# Patient Record
Sex: MALE | Race: WHITE | Employment: FULL TIME | URBAN - METROPOLITAN AREA
[De-identification: names, ages, dates, MRNs, and addresses within clinical notes are randomized per-mention and may not be internally consistent; named-entity substitution may affect disease eponyms.]

---

## 2017-02-02 ENCOUNTER — HOSPITAL ENCOUNTER (OUTPATIENT)
Dept: OTHER | Age: 57
Discharge: HOME | End: 2017-02-02
Attending: INTERNAL MEDICINE | Admitting: INTERNAL MEDICINE

## 2017-02-02 LAB
A/G RATIO: 1.4 (ref 1.1–2.2)
ALBUMIN SERPL-MCNC: 3.9 G/DL (ref 3.4–5)
ALP BLD-CCNC: 75 U/L (ref 40–129)
ALT SERPL-CCNC: 34 U/L (ref 10–40)
ANION GAP SERPL CALCULATED.3IONS-SCNC: 13 MMOL/L (ref 3–16)
AST SERPL-CCNC: 30 U/L (ref 15–37)
BILIRUB SERPL-MCNC: 0.4 MG/DL (ref 0–1)
BUN BLDV-MCNC: 11 MG/DL (ref 7–20)
CALCIUM SERPL-MCNC: 8.7 MG/DL (ref 8.3–10.6)
CHLORIDE BLD-SCNC: 99 MMOL/L (ref 99–110)
CO2: 26 MMOL/L (ref 21–32)
CREAT SERPL-MCNC: 1 MG/DL (ref 0.9–1.3)
GFR AFRICAN AMERICAN: >60
GFR NON-AFRICAN AMERICAN: >60
GLOBULIN: 2.8 G/DL
GLUCOSE BLD-MCNC: 129 MG/DL (ref 70–99)
HCT VFR BLD CALC: 45.9 % (ref 40.5–52.5)
HEMOGLOBIN: 14.7 G/DL (ref 13.5–17.5)
MAGNESIUM: 2.1 MG/DL (ref 1.8–2.4)
MCH RBC QN AUTO: 27.5 PG (ref 26–34)
MCHC RBC AUTO-ENTMCNC: 32.1 G/DL (ref 31–36)
MCV RBC AUTO: 85.7 FL (ref 80–100)
PDW BLD-RTO: 15 % (ref 12.4–15.4)
PHOSPHORUS: 2.4 MG/DL (ref 2.5–4.9)
PLATELET # BLD: 290 K/UL (ref 135–450)
PMV BLD AUTO: 8.4 FL (ref 5–10.5)
POTASSIUM SERPL-SCNC: 3.7 MMOL/L (ref 3.5–5.1)
RBC # BLD: 5.36 M/UL (ref 4.2–5.9)
SODIUM BLD-SCNC: 138 MMOL/L (ref 136–145)
TOTAL PROTEIN: 6.7 G/DL (ref 6.4–8.2)
WBC # BLD: 9.2 K/UL (ref 4–11)

## 2017-10-18 ENCOUNTER — HOSPITAL ENCOUNTER (OUTPATIENT)
Dept: PHYSICAL THERAPY | Age: 57
Discharge: HOME | End: 2017-10-31
Admitting: EMERGENCY MEDICINE

## 2017-10-18 NOTE — PLAN OF CARE
including       G-Codes:  PT G-Codes  Functional Assessment Tool Used: RILEY  Score: 28%  Functional Limitation: Mobility: Walking and moving around  Mobility: Walking and Moving Around Current Status (): At least 20 percent but less than 40 percent impaired, limited or restricted  Mobility: Walking and Moving Around Goal Status (): At least 1 percent but less than 20 percent impaired, limited or restricted    ASSESSMENT: Patient presents today with lumbar radic with gluteal inhibition. He has mild strength loss of R PF , patient was initiated on HEP that consisted of hip mobility in fieg 4 positioning.    Functional Impairments:     [x]Noted lumbar/proximal hip hypomobility   []Noted lumbosacral and/or generalized hypermobility   []Decreased Lumbosacral/hip/LE functional ROM   [x]Decreased core/proximal hip strength and neuromuscular control    [x]Decreased LE functional strength    []Abnormal reflexes/sensation/myotomal/dermatomal deficits  [x]Reduced balance/proprioceptive control    []other:      Functional Activity Limitations (from functional questionnaire and intake)   [x]Reduced ability to tolerate prolonged functional positions   []Reduced ability or difficulty with changes of positions or transfers between positions   []Reduced ability to maintain good posture and demonstrate good body mechanics with sitting, bending, and lifting   []Reduced ability to sleep   [x] Reduced ability or tolerance with driving and/or computer work   [x]Reduced ability to perform lifting, reaching, carrying tasks   []Reduced ability to squat   []Reduced ability to forward bend   [x]Reduced ability to ambulate prolonged functional periods/distances/surfaces   []Reduced ability to ascend/descend stairs   []other:       Participation Restrictions   []Reduced participation in self care activities   [x]Reduced participation in home management activities   [x]Reduced participation in work activities   [x]Reduced participation [] high complexity using standardized patient assessment instrument and/or measurable assessment of functional outcome. [x] EVAL (LOW) 72240 (typically 30 minutes face-to-face)  [] EVAL (MOD) 60661 (typically 30 minutes face-to-face)  [] EVAL (HIGH) 50226 (typically 45 minutes face-to-face)  [] RE-EVAL     PLAN: Begin PT focusing on: proximal hip mobilizations, LB mobs, LB core activation, proximal hip activation, and HEP    Frequency/Duration:  2 days per week for 4 Weeks:  Interventions:  [x]  Therapeutic exercise including: strength training, ROM, for LE, Glutes and core   [x]  NMR activation and proprioception for glutes , LE and Core   [x]  Manual therapy as indicated for Hip complex, LE and spine to include: Dry Needling/IASTM, STM, PROM, Gr I-IV mobilizations, manipulation. [x]  Modalities as needed that may include: thermal agents, E-stim, Biofeedback, US, iontophoresis as indicated  [x]  Patient education on joint protection, postural re-education, activity modification, progression of HEP. HEP instruction:  (see scanned forms)    GOALS:  Patient stated goal: decrease pain and return to work    Therapist goals for Patient:   Short Term Goals: To be achieved in: 2 weeks  1. Independent in HEP and progression per patient tolerance, in order to prevent re-injury. 2. Patient will have a decrease in pain to facilitate improvement in movement, function, and ADLs as indicated by Functional Deficits. Long Term Goals: To be achieved in: 4 weeks  1. Disability index score of 15% or less for the RILEY to assist with reaching prior level of function. 2. Patient will demonstrate increased AROM to WNL, good LS mobility, good hip ROM to allow for proper joint functioning as indicated by patients Functional Deficits. 3. Patient will demonstrate an increase in Strength to good proximal hip and core activation to allow for proper functional mobility as indicated by patients Functional Deficits.    4. Patient will return to walk functional activities without increased symptoms or restriction.    5. Golf with no pain(patient specific functional goal)       Electronically signed by:  Clemente Graham PT

## 2017-10-19 NOTE — FLOWSHEET NOTE
[address], [facility]    Physical Therapy Daily Treatment Note  Date:  10/18/2017    Patient Name:  Joaquim Rosenthal    :  1960  MRN: [id number]  Restrictions/Precautions:    Medical/Treatment Diagnosis Information:  · Diagnosis: M51.16 (ICD-10-CM) - Lumbar disc disease with radiculopathy  · Treatment Diagnosis: M51.16 (ICD-10-CM) - Lumbar disc disease with radiculopathy  Insurance/Certification information:     Physician Information:  Referring Practitioner: Dr Wilkinson Locus of care signed (Y/N):     Date of Patient follow up with Physician:     G-Code (if applicable):      Date G-Code Applied:    PT G-Codes  Functional Assessment Tool Used: RILEY  Score: 28%  Functional Limitation: Mobility: Walking and moving around  Mobility: Walking and Moving Around Current Status (): At least 20 percent but less than 40 percent impaired, limited or restricted  Mobility: Walking and Moving Around Goal Status ():  At least 1 percent but less than 20 percent impaired, limited or restricted    Progress Note: []  Yes  []  No  Next due by: Visit #10      Latex Allergy:  [x]NO      []YES   Preferred Language for Healthcare:   [x]English       []other:     Visit # Insurance Allowable   1 12     Pain level:  0-9/10     SUBJECTIVE:  See eval    OBJECTIVE: See eval  Observation:   Test measurements:      RESTRICTIONS/PRECAUTIONS: R hip flexor, Lateral hip pain and LE tingling    Exercises/Interventions:     Therapeutic Ex Wt / Resistance sets/sec reps notes   Hip Ext       Bridge 2-1       Kneeling Alt Arm-Leg       Side lying LB rot       Front Plank       Side planks        Kneeling hip abd/ext       1/2 kneeling down chop       Std band pull down       SL hip abd/clam       Fig 4 st supine KAYY  1 15           Ham string stretch  3 30    Hip Flex stretch  3 30    Glute Stretch  3 30           Manual Intervention               Prone PA L5  5 min     GISTM/STM Hip

## 2017-10-23 ENCOUNTER — HOSPITAL ENCOUNTER (OUTPATIENT)
Dept: PHYSICAL THERAPY | Age: 57
Discharge: HOME | End: 2017-10-23
Admitting: EMERGENCY MEDICINE

## 2017-10-23 NOTE — FLOWSHEET NOTE
Prone PA  T12/L1,L5  12 min     GISTM/STM Hip flexor 5 min     Prone Fig 4  10 min     SI Manip       Hip belt mobs       Hip LA distraction              NMR re-education        Mf Activation- re-ed  10 10 Prone    TrA Re-ed activation  10 10 supine    Glute Max re-ed activation  10 10 Prone                    Therapeutic Exercise and NMR EXR  [x] (20062) Provided verbal/tactile cueing for activities related to strengthening, flexibility, endurance, ROM  for improvements in proximal hip and core control with self care, mobility, lifting and ambulation. [x] (76663) Provided verbal/tactile cueing for activities related to improving balance, coordination, kinesthetic sense, posture, motor skill, proprioception  to assist with core control in self care, mobility, lifting, and ambulation.      Therapeutic Activities:    [x] (64613 or 58768) Provided verbal/tactile cueing for activities related to improving balance, coordination, kinesthetic sense, posture, motor skill, proprioception and motor activation to allow for proper function  with self care and ADLs  [] (11524) Provided training and instruction to the patient for proper core and proximal hip recruitment and positioning with ambulation re-education     Home Exercise Program:    [x] (06370) Reviewed/Progressed HEP activities related to strengthening, flexibility, endurance, ROM of core, proximal hip and LE for functional self-care, mobility, lifting and ambulation   [] (58357) Reviewed/Progressed HEP activities related to improving balance, coordination, kinesthetic sense, posture, motor skill, proprioception of core, proximal hip and LE for self care, mobility, lifting, and ambulation      Manual Treatments:  PROM / STM / Oscillations-Mobs:  G-I, II, III, IV (PA's, Inf., Post.)  [] (53719) Provided manual therapy to mobilize proximal hip and LS spine soft tissue/joints for the purpose of modulating pain, promoting relaxation,  increasing ROM, limited by fatique  [] Patient limited by pain  [] Patient limited by other medical complications  [] Other:     Prognosis: [] Good [] Fair  [] Poor    Patient Requires Follow-up: [x] Yes  [] No    PLAN: See eval  [] Continue per plan of care [] Alter current plan (see comments)  [x] Plan of care initiated [] Hold pending MD visit [] Discharge    Electronically signed by: Celine Mccullough PT

## 2017-11-01 ENCOUNTER — HOSPITAL ENCOUNTER (OUTPATIENT)
Dept: PHYSICAL THERAPY | Age: 57
Discharge: HOME | End: 2017-11-30
Attending: EMERGENCY MEDICINE | Admitting: EMERGENCY MEDICINE

## 2017-11-03 ENCOUNTER — HOSPITAL ENCOUNTER (OUTPATIENT)
Dept: PHYSICAL THERAPY | Age: 57
Discharge: HOME | End: 2017-11-03
Admitting: EMERGENCY MEDICINE

## 2017-11-10 ENCOUNTER — HOSPITAL ENCOUNTER (OUTPATIENT)
Dept: PHYSICAL THERAPY | Age: 57
Discharge: HOME | End: 2017-11-10
Admitting: EMERGENCY MEDICINE

## 2017-11-10 NOTE — FLOWSHEET NOTE
[address], [facility]    Physical Therapy Daily Treatment Note  Date:  11/10/2017    Patient Name:  Willow Bansal    :  1960  MRN: [id number]  Restrictions/Precautions:    Medical/Treatment Diagnosis Information:  · Diagnosis: M51.16 (ICD-10-CM) - Lumbar disc disease with radiculopathy  · Treatment Diagnosis: M51.16 (ICD-10-CM) - Lumbar disc disease with radiculopathy  Insurance/Certification information:     Physician Information:  Referring Practitioner: Dr Joshua Rosa of care signed (Y/N):     Date of Patient follow up with Physician:     G-Code (if applicable):      Date G-Code Applied:    PT G-Codes  Functional Assessment Tool Used: RILEY  Score: 28%  Functional Limitation: Mobility: Walking and moving around  Mobility: Walking and Moving Around Current Status (): At least 20 percent but less than 40 percent impaired, limited or restricted  Mobility: Walking and Moving Around Goal Status (): At least 1 percent but less than 20 percent impaired, limited or restricted    Progress Note: []  Yes  []  No  Next due by: Visit #10      Latex Allergy:  [x]NO      []YES   Preferred Language for Healthcare:   [x]English       []other:     Visit # Insurance Allowable   5 11/10 12     Pain level:  0-5/10     SUBJECTIVE:  Improved hip mobility in hip and increased ability to walk,mild hip fatigue with treatment. Overall, better activation of proximal gluteal mm.     OBJECTIVE: pain with extension and R Sb, NT into R LE  Observation:   Test measurements:      RESTRICTIONS/PRECAUTIONS: R hip flexor, Lateral hip pain and LE tingling    Exercises/Interventions:     Therapeutic Ex Wt / Resistance sets/sec reps notes   Hip Ext       Bridge 2-1       Kneeling Alt Arm-Leg       Side lying LB rot       Front Plank       Side planks        Kneeling hip abd/ext       1/2 kneeling down chop       Lateral step up 4\" 1 12    Reverse clam  2 12    Fig 4 st supine KAYY  1 15    Ant hip flexor st  30 sec 5     Belt PPT  2 12    SLS with glute med act  5 sec 15                  Manual Intervention               Prone PA  T12/L1,L5  5 min     GISTM/STM Hip flexor 0 min     Pro ne Fig 4  0 min     Lumbar rot mob L5-S1 10 min     Hip belt mobs       Hip LA distraction              NMR re-education         Sidely/supine clam w manual resist  1 20    Sidely pelvic PNF  1 15    Sidely hip abd with pelvic stabilization  1 15    Glut med at wall  5 sec 15    BOSU lunge  1 15      Therapeutic Exercise and NMR EXR  [x] (18484) Provided verbal/tactile cueing for activities related to strengthening, flexibility, endurance, ROM  for improvements in proximal hip and core control with self care, mobility, lifting and ambulation. [x] (27702) Provided verbal/tactile cueing for activities related to improving balance, coordination, kinesthetic sense, posture, motor skill, proprioception  to assist with core control in self care, mobility, lifting, and ambulation.      Therapeutic Activities:    [x] (98617 or 85934) Provided verbal/tactile cueing for activities related to improving balance, coordination, kinesthetic sense, posture, motor skill, proprioception and motor activation to allow for proper function  with self care and ADLs  [] (35781) Provided training and instruction to the patient for proper core and proximal hip recruitment and positioning with ambulation re-education     Home Exercise Program:    [x] (78908) Reviewed/Progressed HEP activities related to strengthening, flexibility, endurance, ROM of core, proximal hip and LE for functional self-care, mobility, lifting and ambulation   [] (80910) Reviewed/Progressed HEP activities related to improving balance, coordination, kinesthetic sense, posture, motor skill, proprioception of core, proximal hip and LE for self care, mobility, lifting, and ambulation      Manual Treatments:  PROM / STM / Oscillations-Mobs:  G-I, II, III, IV

## 2017-11-13 ENCOUNTER — HOSPITAL ENCOUNTER (OUTPATIENT)
Dept: PHYSICAL THERAPY | Age: 57
Discharge: HOME | End: 2017-11-13
Admitting: EMERGENCY MEDICINE

## 2017-11-13 NOTE — FLOWSHEET NOTE
table 30 sec 5     Belt PPT  2 12    SLS with glute med act  5 sec 15                  Manual Intervention               Prone PA  T12/L1,L5  5 min     GISTM/STM Hip flexor 0 min     Pro ne Fig 4  0 min     Lumbar rot mob L5-S1 10 min     Hip belt mobs       Hip LA distraction              NMR re-education         Sidely/supine clam w manual resist  1 20    Sidely pelvic PNF  1 15    Sidely hip abd with pelvic stabilization  1 15    SLS airex       Glut med at wall  5 sec 15    BOSU lunge  1 15      Therapeutic Exercise and NMR EXR  [x] (41965) Provided verbal/tactile cueing for activities related to strengthening, flexibility, endurance, ROM  for improvements in proximal hip and core control with self care, mobility, lifting and ambulation. [x] (11252) Provided verbal/tactile cueing for activities related to improving balance, coordination, kinesthetic sense, posture, motor skill, proprioception  to assist with core control in self care, mobility, lifting, and ambulation.      Therapeutic Activities:    [x] (53072 or 34946) Provided verbal/tactile cueing for activities related to improving balance, coordination, kinesthetic sense, posture, motor skill, proprioception and motor activation to allow for proper function  with self care and ADLs  [] (33077) Provided training and instruction to the patient for proper core and proximal hip recruitment and positioning with ambulation re-education     Home Exercise Program:    [x] (25835) Reviewed/Progressed HEP activities related to strengthening, flexibility, endurance, ROM of core, proximal hip and LE for functional self-care, mobility, lifting and ambulation   [] (65346) Reviewed/Progressed HEP activities related to improving balance, coordination, kinesthetic sense, posture, motor skill, proprioception of core, proximal hip and LE for self care, mobility, lifting, and ambulation      Manual Treatments:  PROM / STM / Oscillations-Mobs:  G-I, II, III, IV (PA's, Inf.,

## 2017-11-15 ENCOUNTER — HOSPITAL ENCOUNTER (OUTPATIENT)
Dept: PHYSICAL THERAPY | Age: 57
Discharge: HOME | End: 2017-11-15
Admitting: EMERGENCY MEDICINE

## 2017-11-15 NOTE — FLOWSHEET NOTE
Inf., Post.)  [] (53197) Provided manual therapy to mobilize proximal hip and LS spine soft tissue/joints for the purpose of modulating pain, promoting relaxation,  increasing ROM, reducing/eliminating soft tissue swelling/inflammation/restriction, improving soft tissue extensibility and allowing for proper ROM for normal function with self care, mobility, lifting and ambulation. Modalities:       Charges:  Timed Code Treatment Minutes: 45   Total Treatment Minutes: 45     [] EVAL  [x] CH(05881) x  2   [] IONTO  [x] NMR (26536) x  1   [] VASO  [] Manual (29505) x       [] Other:  [] TA x       [] Mech Traction (98083)  [] ES(attended) (81682)      [] ES (un) (28970):     Goals:     1. Independent in HEP and progression per patient tolerance, in order to prevent re-injury. 2. Patient will have a decrease in pain to facilitate improvement in movement, function, and ADLs as indicated by Functional Deficits. Long Term Goals: To be achieved in: 4 weeks  1. Disability index score of 15% or less for the RILEY to assist with reaching prior level of function. 2. Patient will demonstrate increased AROM to WNL, good LS mobility, good hip ROM to allow for proper joint functioning as indicated by patients Functional Deficits. 3. Patient will demonstrate an increase in Strength to good proximal hip and core activation to allow for proper functional mobility as indicated by patients Functional Deficits. 4. Patient will return to walk functional activities without increased symptoms or restriction. 5. Golf with no pain(patient specific functional goal)      Progression Towards Functional goals:  [] Patient is progressing as expected towards functional goals listed. [] Progression is slowed due to complexities listed. [] Progression has been slowed due to co-morbidities.   [x] Plan just implemented, too soon to assess goals progression  [] Other:     ASSESSMENT:  Patient had improved ability to recruit glut med

## 2017-11-20 ENCOUNTER — HOSPITAL ENCOUNTER (OUTPATIENT)
Dept: PHYSICAL THERAPY | Age: 57
Discharge: HOME | End: 2017-11-20
Admitting: EMERGENCY MEDICINE

## 2017-11-20 NOTE — FLOWSHEET NOTE
Standing TB pull  grey 1 15    Lateral step up    Reverse clam/clam  2 12    Fig 4 st supine KAYY  1 15    Ant hip flexor st Prone on table 30 sec 5     Belt PPT  2 12    SLS with glute med act  5 sec 15                  Manual Intervention               Prone PA  T12/L1,L5  8 min  Min change with R SB after PA   GISTM/STM Hip flexor 0 min     Pro ne Fig 4  0 min     Lumbar rot mob L5-S1 5 min     Hip belt mobs       Hip LA distraction with medial glide  5 min            NMR re-education         Sidely/supine clam w manual resist     Sidely pelvic PNF  1 15    Sidely hip abd with pelvic stabilization  SLS airex       Glut med at wall  5 sec 15    BOSU lunge  1 15      Therapeutic Exercise and NMR EXR  [x] (44557) Provided verbal/tactile cueing for activities related to strengthening, flexibility, endurance, ROM  for improvements in proximal hip and core control with self care, mobility, lifting and ambulation. [x] (07779) Provided verbal/tactile cueing for activities related to improving balance, coordination, kinesthetic sense, posture, motor skill, proprioception  to assist with core control in self care, mobility, lifting, and ambulation.      Therapeutic Activities:    [x] (25667 or 14128) Provided verbal/tactile cueing for activities related to improving balance, coordination, kinesthetic sense, posture, motor skill, proprioception and motor activation to allow for proper function  with self care and ADLs  [] (33837) Provided training and instruction to the patient for proper core and proximal hip recruitment and positioning with ambulation re-education     Home Exercise Program:    [x] (43006) Reviewed/Progressed HEP activities related to strengthening, flexibility, endurance, ROM of core, proximal hip and LE for functional self-care, mobility, lifting and ambulation   [] (72482) Reviewed/Progressed HEP activities related to improving balance, coordination, kinesthetic sense, posture, motor skill, proprioception of core, proximal hip and LE for self care, mobility, lifting, and ambulation      Manual Treatments:  PROM / STM / Oscillations-Mobs:  G-I, II, III, IV (PA's, Inf., Post.)  [] (49388) Provided manual therapy to mobilize proximal hip and LS spine soft tissue/joints for the purpose of modulating pain, promoting relaxation,  increasing ROM, reducing/eliminating soft tissue swelling/inflammation/restriction, improving soft tissue extensibility and allowing for proper ROM for normal function with self care, mobility, lifting and ambulation. Modalities:       Charges:  Timed Code Treatment Minutes: 45   Total Treatment Minutes: 45     [] EVAL  [x] OK(41504) x  1   [] IONTO  [x] NMR (44840) x  1   [] VASO  [x] Manual (31531) x  1    [] Other:  [] TA x       [] Mech Traction (21242)  [] ES(attended) (04005)      [] ES (un) (13857):     Goals:     1. Independent in HEP and progression per patient tolerance, in order to prevent re-injury. 2. Patient will have a decrease in pain to facilitate improvement in movement, function, and ADLs as indicated by Functional Deficits. Long Term Goals: To be achieved in: 4 weeks  1. Disability index score of 15% or less for the RILEY to assist with reaching prior level of function. 2. Patient will demonstrate increased AROM to WNL, good LS mobility, good hip ROM to allow for proper joint functioning as indicated by patients Functional Deficits. 3. Patient will demonstrate an increase in Strength to good proximal hip and core activation to allow for proper functional mobility as indicated by patients Functional Deficits. 4. Patient will return to walk functional activities without increased symptoms or restriction. 5. Golf with no pain(patient specific functional goal)      Progression Towards Functional goals:  [] Patient is progressing as expected towards functional goals listed. [] Progression is slowed due to complexities listed.   [] Progression has been

## 2017-11-22 ENCOUNTER — HOSPITAL ENCOUNTER (OUTPATIENT)
Dept: PHYSICAL THERAPY | Age: 57
Discharge: HOME | End: 2017-11-22
Admitting: EMERGENCY MEDICINE

## 2017-11-22 NOTE — FLOWSHEET NOTE
[address], [facility]    Physical Therapy Daily Treatment Note  Date:  2017    Patient Name:  Gutierrez Phillips    :  1960  MRN: [id number]  Restrictions/Precautions:    Medical/Treatment Diagnosis Information:  · Diagnosis: M51.16 (ICD-10-CM) - Lumbar disc disease with radiculopathy  · Treatment Diagnosis: M51.16 (ICD-10-CM) - Lumbar disc disease with radiculopathy  Insurance/Certification information:     Physician Information:  Referring Practitioner: Dr Isreal Atwood of care signed (Y/N):     Date of Patient follow up with Physician:     G-Code (if applicable):      Date G-Code Applied:    PT G-Codes  Functional Assessment Tool Used: RILEY  Score: 28%  Functional Limitation: Mobility: Walking and moving around  Mobility: Walking and Moving Around Current Status (): At least 20 percent but less than 40 percent impaired, limited or restricted  Mobility: Walking and Moving Around Goal Status (): At least 1 percent but less than 20 percent impaired, limited or restricted    Progress Note: []  Yes  []  No  Next due by: Visit #10      Latex Allergy:  [x]NO      []YES   Preferred Language for Healthcare:   [x]English       []other:     Visit # Insurance Allowable    12     Pain level:  0-5/10     SUBJECTIVE:  After last session, did a good deal of walking ,good sore in hip.        OBJECTIVE: pain with extension and R Sb, NT into R LE  Observation:   Test measurements:      RESTRICTIONS/PRECAUTIONS: R hip flexor, Lateral hip pain and LE tingling    Exercises/Interventions:     Therapeutic Ex Wt / Resistance sets/sec reps notes   Treadmill 1.5 mph  10 min    Bridge 2-1       Kneeling Alt Arm-Leg       Side lying LB rot       Front Plank       Retro lunge with slide  1 15    Kneeling hip abd/ext       Standing TB pull  grey 1 15    Lateral step up    Reverse clam/clam  2 12    Fig 4 st supine KAYY  1 15    Ant hip flexor st Prone on table 30

## 2017-11-27 ENCOUNTER — PAT TELEPHONE (OUTPATIENT)
Dept: PREADMISSION TESTING | Age: 57
End: 2017-11-27

## 2017-11-27 ENCOUNTER — HOSPITAL ENCOUNTER (OUTPATIENT)
Dept: PHYSICAL THERAPY | Age: 57
Discharge: HOME | End: 2017-11-27
Admitting: EMERGENCY MEDICINE

## 2017-11-27 VITALS — HEIGHT: 72 IN | BODY MASS INDEX: 35.21 KG/M2 | WEIGHT: 260 LBS

## 2017-11-27 RX ORDER — ESCITALOPRAM OXALATE 10 MG/1
10 TABLET ORAL DAILY
COMMUNITY
End: 2020-01-06

## 2017-11-27 NOTE — PLAN OF CARE
[facility] [address] [place] [address]      Physical Therapy Re-Certification Plan of Care/MD UPDATE      Dear  Dr Reji Goode,    We had the pleasure of treating the following patient for physical therapy services at [address] [place] [address]. A summary of our findings can be found in the updated assessment below. This includes our plan of care. If you have any questions or concerns regarding these findings, please do not hesitate to contact me at the office phone number checked above. Thank you for the referral.     Physician Signature:________________________________Date:__________________  By signing above (or electronic signature), therapists plan is approved by physician    Date Range Of Visits: 10/18/17-17  Total Visits to Date: 8  Overall Response to Treatment:   [x]Patient is responding well to treatment and improvement is noted with regards  to goals   []Patient should continue to improve in reasonable time if they continue HEP   []Patient has plateaued and is no longer responding to skilled PT intervention    []Patient is getting worse and would benefit from return to referring MD   []Patient unable to adhere to initial POC   []Other: Patient is progressing well. Walking tolerance is improving as his proximal hip strength, minimal R LE symptoms to this point.           Physical Therapy Daily Treatment Note  Date:  2017    Patient Name:  Hema Borja    :  1960  MRN: [id number]  Restrictions/Precautions:    Medical/Treatment Diagnosis Information:  · Diagnosis: M51.16 (ICD-10-CM) - Lumbar disc disease with radiculopathy  · Treatment Diagnosis: M51.16 (ICD-10-CM) - Lumbar disc disease with radiculopathy  Insurance/Certification information:     Physician Information:  Referring Practitioner: Dr Danilo Lowery of care signed (Y/N):     Date of Patient follow up with Physician:     G-Code (if applicable):      Date G-Code Applied:    PT G-Codes  Functional Assessment Tool Used: RILEY  Score: 10%  Functional Limitation: Mobility: Walking and moving around  Mobility: Walking and Moving Around Current Status (): At least 1 percent but less than 20percent impaired, limited or restricted  Mobility: Walking and Moving Around Goal Status (): At least 1 percent but less than 20 percent impaired, limited or restricted    Progress Note: []  Yes  []  No  Next due by: Visit #10      Latex Allergy:  [x]NO      []YES   Preferred Language for Healthcare:   [x]English       []other:     Visit # Insurance Allowable   10 11/20 12     Pain level:  0-5/10     SUBJECTIVE:  Improved walking tolerance.   No pain in hip       OBJECTIVE: pain with extension and R Sb, NT into R Le, hip abd 4/5  Observation:   Test measurements:      RESTRICTIONS/PRECAUTIONS: R hip flexor, Lateral hip pain and LE tingling    Exercises/Interventions:     Therapeutic Ex Wt / Resistance sets/sec reps notes   Treadmill 1.5 mph  10 min    Bridge 2-1       Kneeling Alt Arm-Leg       Side lying LB rot       Front Plank       Retro lunge with slide  1 15    Kneeling hip abd/ext       Standing TB pull  grey 1 15    Lateral step up    Reverse clam/clam  2 12    Fig 4 st supine KAYY  1 15    Ant hip flexor st Prone on table 30 sec 5     Belt PPT  2 12    SLS with glute med act  5 sec 15    Hip abd  3 5           Manual Intervention               Prone PA  T12/L1,L5  0 min  Min change with R SB after PA   GISTM/STM Hip flexor 0  min     Pro ne Fig 4  0 min     Lumbar rot mob L5-S1 0 min     Hip belt mobs       Hip LA distraction with medial glide  0 min            NMR re-education         Sidely/supine clam w manual resist     SLS rebounder  1 10    Sidely pelvic PNF  1 15    Sidely hip abd with pelvic stabilization  SLS airex       Glut med at wall  5 sec 15    BOSU lunge  1 15      Therapeutic Exercise and NMR EXR  [x] (68369) Provided verbal/tactile cueing for activities related to Traction (39850)  [] ES(attended) (72587)      [] ES (un) (38102):     Goals:     1. Independent in HEP and progression per patient tolerance, in order to prevent re-injury. 2. Patient will have a decrease in pain to facilitate improvement in movement, function, and ADLs as indicated by Functional Deficits. Long Term Goals: To be achieved in: 4 weeks  1. Disability index score of 15% or less for the RILEY to assist with reaching prior level of function. 100% MET  2. Patient will demonstrate increased AROM to WNL, good LS mobility, good hip ROM to allow for proper joint functioning as indicated by patients Functional Deficits. 75% MET  3. Patient will demonstrate an increase in Strength to good proximal hip and core activation to allow for proper functional mobility as indicated by patients Functional Deficits. 75% MET  4. Patient will return to walk functional activities without increased symptoms or restriction. 50% MET   5. Golf with no pain(patient specific functional goal)   Not met    Progression Towards Functional goals:  [x] Patient is progressing as expected towards functional goals listed. [] Progression is slowed due to complexities listed. [] Progression has been slowed due to co-morbidities. [] Plan just implemented, too soon to assess goals progression  [] Other:     ASSESSMENT:  Patient had improved endurance and ROM , no pain with lumbar extension, just end range R Sb. SB improved with hip unloading. Fatigue at the end of treatment. He continues to have improved walking tolerance with less R LE symptoms. Continue to progressively load proximal hip strengthening and balance activity.         Treatment/Activity Tolerance:  [] Patient tolerated treatment well [] Patient limited by fatique  [] Patient limited by pain  [] Patient limited by other medical complications  [] Other:     Prognosis: [] Good [] Fair  [] Poor    Patient Requires Follow-up: [x] Yes  [] No    PLAN: Cont 1-2 x per week

## 2017-11-29 ENCOUNTER — HOSPITAL ENCOUNTER (OUTPATIENT)
Dept: PHYSICAL THERAPY | Age: 57
Discharge: HOME | End: 2017-11-29
Admitting: EMERGENCY MEDICINE

## 2017-12-01 ENCOUNTER — HOSPITAL ENCOUNTER (OUTPATIENT)
Dept: PHYSICAL THERAPY | Age: 57
Discharge: HOME | End: 2017-12-31
Attending: EMERGENCY MEDICINE | Admitting: EMERGENCY MEDICINE

## 2017-12-04 ENCOUNTER — HOSPITAL ENCOUNTER (OUTPATIENT)
Dept: ENDOSCOPY | Age: 57
Discharge: HOME | End: 2017-12-04
Attending: INTERNAL MEDICINE | Admitting: INTERNAL MEDICINE

## 2017-12-04 VITALS
RESPIRATION RATE: 16 BRPM | WEIGHT: 258 LBS | OXYGEN SATURATION: 100 % | HEART RATE: 98 BPM | HEIGHT: 72 IN | TEMPERATURE: 97 F | SYSTOLIC BLOOD PRESSURE: 157 MMHG | DIASTOLIC BLOOD PRESSURE: 93 MMHG | BODY MASS INDEX: 34.95 KG/M2

## 2017-12-04 LAB
GLUCOSE BLD-MCNC: 120 MG/DL (ref 70–99)
PERFORMED ON: ABNORMAL

## 2017-12-04 RX ORDER — SODIUM CHLORIDE 0.9 % (FLUSH) 0.9 %
10 SYRINGE (ML) INJECTION EVERY 12 HOURS SCHEDULED
Status: DISCONTINUED | OUTPATIENT
Start: 2017-12-04 | End: 2017-12-05 | Stop reason: HOSPADM

## 2017-12-04 RX ORDER — SODIUM CHLORIDE 0.9 % (FLUSH) 0.9 %
10 SYRINGE (ML) INJECTION PRN
Status: DISCONTINUED | OUTPATIENT
Start: 2017-12-04 | End: 2017-12-05 | Stop reason: HOSPADM

## 2017-12-04 RX ORDER — SODIUM CHLORIDE 9 MG/ML
INJECTION, SOLUTION INTRAVENOUS CONTINUOUS
Status: DISCONTINUED | OUTPATIENT
Start: 2017-12-04 | End: 2017-12-05 | Stop reason: HOSPADM

## 2017-12-04 RX ADMIN — SODIUM CHLORIDE: 9 INJECTION, SOLUTION INTRAVENOUS at 06:53

## 2017-12-04 ASSESSMENT — PAIN SCALES - GENERAL
PAINLEVEL_OUTOF10: 0

## 2017-12-04 ASSESSMENT — PAIN - FUNCTIONAL ASSESSMENT: PAIN_FUNCTIONAL_ASSESSMENT: 0-10

## 2017-12-04 ASSESSMENT — LIFESTYLE VARIABLES: SMOKING_STATUS: 0

## 2017-12-04 NOTE — H&P
[facility]   Pre-operative History and Physical    Patient: Ruy Viveros  : 1960  Acct#: [de-identified]    History Obtained From: electronic medical record    HISTORY OF PRESENT ILLNESS  Procedure:EGD and Colonoscopy  Indications:polyp surveillance and Cao's  Past Medical History:        Diagnosis Date    Asthma     Cao's esophagus     Hx of blood clots     Hypertension     Kidney calculi     Polycythemia     1 month if needed     Past Surgical History:        Procedure Laterality Date    LITHOTRIPSY      TONSILLECTOMY       Medications Prior to Admission:   Current Outpatient Prescriptions on File Prior to Encounter   Medication Sig Dispense Refill    metFORMIN (GLUCOPHAGE) 500 MG tablet Take 500 mg by mouth 2 times daily (with meals)      cloNIDine (CATAPRES) 0.1 MG/24HR Place 1 patch onto the skin once a week      escitalopram (LEXAPRO) 10 MG tablet Take 10 mg by mouth daily      NIFEdipine (NIFEDIAC CC) 60 MG CR tablet Take 60 mg by mouth 2 times daily.  metoprolol (TOPROL-XL) 200 MG XL tablet Take 200 mg by mouth daily. 1 1/2 tabs daily       apixaban (ELIQUIS) 5 MG TABS tablet Take by mouth 2 times daily      esomeprazole (NEXIUM) 40 MG capsule Take 20 mg by mouth every morning (before breakfast)       albuterol (PROVENTIL HFA;VENTOLIN HFA) 108 (90 BASE) MCG/ACT inhaler Inhale 2 puffs into the lungs every 6 hours as needed.  triamcinolone (AZMACORT) 75 MCG/ACT inhaler Inhale 1 puff into the lungs 2 times daily. No current facility-administered medications on file prior to encounter. Allergies:  Enalapril and No known allergies  Social History     Social History    Marital status:      Spouse name: N/A    Number of children: N/A    Years of education: N/A     Occupational History    Not on file.      Social History Main Topics    Smoking status: Never Smoker    Smokeless tobacco: Never Used    Alcohol use Yes      Comment: rare socially  Drug use: No    Sexual activity: Yes     Partners: Female      Comment:      Other Topics Concern    Not on file     Social History Narrative    No narrative on file     Family History   Problem Relation Age of Onset    Heart Disease Father     Diabetes Father     Heart Attack Father          PHYSICAL EXAM:      BP (!) 164/100   Pulse 85   Temp 97.5 °F (36.4 °C) (Temporal)   Resp 17   Ht 6' (1.829 m)   Wt 258 lb (117 kg)   SpO2 100%   BMI 34.99 kg/m²  I        Heart:normal    Lungs: normal    Abdomen: normal      ASA Grade:  See anesthesia note      ASSESSMENT AND PLAN:    1. Procedure options, risks and benefits reviewed with patient and expresses understanding.

## 2017-12-04 NOTE — PROCEDURES
polyp. Recommendations:  Await pathology. Repeat colonoscopy in 2 years.  Hold Eliquis for another 24-48 hours    Dragan Pryor MD   [facility]  12/4/2017

## 2017-12-04 NOTE — ANESTHESIA PRE-OP
[facility] Department of Anesthesiology  Pre-Anesthesia Evaluation/Consultation       Name:  Amado Escalante  : 1960  Age:  55 y. o. MRN:  [id number]  Date: 2017           Procedure (Scheduled):  EGD/colonoscopy  Surgeon:  Dr. Tigist Angeles     Allergies   Allergen Reactions    Enalapril Swelling    No Known Allergies      Patient Active Problem List   Diagnosis    Chest pain    HTN (hypertension)    GERD (gastroesophageal reflux disease)    Asthma    ARACELIS (obstructive sleep apnea)    Sinusitis, chronic    Allergic sinusitis     Past Medical History:   Diagnosis Date    Asthma     Cao's esophagus     Hx of blood clots     Hypertension     Kidney calculi     Polycythemia     1 month if needed     Past Surgical History:   Procedure Laterality Date    LITHOTRIPSY      TONSILLECTOMY       Social History   Substance Use Topics    Smoking status: Never Smoker    Smokeless tobacco: Never Used    Alcohol use Yes      Comment: rare socially     Medications  Current Outpatient Prescriptions on File Prior to Encounter   Medication Sig Dispense Refill    metFORMIN (GLUCOPHAGE) 500 MG tablet Take 500 mg by mouth 2 times daily (with meals)      apixaban (ELIQUIS) 5 MG TABS tablet Take by mouth 2 times daily      cloNIDine (CATAPRES) 0.1 MG/24HR Place 1 patch onto the skin once a week      escitalopram (LEXAPRO) 10 MG tablet Take 10 mg by mouth daily      esomeprazole (NEXIUM) 40 MG capsule Take 20 mg by mouth every morning (before breakfast)       NIFEdipine (NIFEDIAC CC) 60 MG CR tablet Take 60 mg by mouth 2 times daily.  metoprolol (TOPROL-XL) 200 MG XL tablet Take 200 mg by mouth daily. 1 1/2 tabs daily       albuterol (PROVENTIL HFA;VENTOLIN HFA) 108 (90 BASE) MCG/ACT inhaler Inhale 2 puffs into the lungs every 6 hours as needed.  triamcinolone (AZMACORT) 75 MCG/ACT inhaler Inhale 1 puff into the lungs 2 times daily.        No current facility-administered medications on file prior to encounter. Current Outpatient Prescriptions   Medication Sig Dispense Refill    metFORMIN (GLUCOPHAGE) 500 MG tablet Take 500 mg by mouth 2 times daily (with meals)      apixaban (ELIQUIS) 5 MG TABS tablet Take by mouth 2 times daily      cloNIDine (CATAPRES) 0.1 MG/24HR Place 1 patch onto the skin once a week      escitalopram (LEXAPRO) 10 MG tablet Take 10 mg by mouth daily      esomeprazole (NEXIUM) 40 MG capsule Take 20 mg by mouth every morning (before breakfast)       NIFEdipine (NIFEDIAC CC) 60 MG CR tablet Take 60 mg by mouth 2 times daily.  metoprolol (TOPROL-XL) 200 MG XL tablet Take 200 mg by mouth daily. 1 1/2 tabs daily       albuterol (PROVENTIL HFA;VENTOLIN HFA) 108 (90 BASE) MCG/ACT inhaler Inhale 2 puffs into the lungs every 6 hours as needed.  triamcinolone (AZMACORT) 75 MCG/ACT inhaler Inhale 1 puff into the lungs 2 times daily.        Current Facility-Administered Medications   Medication Dose Route Frequency Provider Last Rate Last Dose    0.9 % sodium chloride infusion   Intravenous Continuous Mary Prakash MD        sodium chloride flush 0.9 % injection 10 mL  10 mL Intravenous 2 times per day Mary Prakash MD        sodium chloride flush 0.9 % injection 10 mL  10 mL Intravenous PRN Mary Prakash MD         Vital Signs (Current)   Vitals:    17   BP: (!) 164/100   Pulse: 85   Resp: 17   Temp: 97.5 °F (36.4 °C)   SpO2: 100%     Vital Signs Statistics (for past 48 hrs)     Temp  Av.5 °F (36.4 °C)  Min: 97.5 °F (36.4 °C)   Min taken time: 17  Max: 97.5 °F (36.4 °C)   Max taken time: 17  Pulse  Av  Min: 85   Min taken time: 17  Max: 80   Max taken time: 17  Resp  Av  Min: 16   Min taken time: 17  Max: 16   Max taken time: 17  BP  Min: 164/100   Min taken time: 17  Max: 164/100   Max taken time: Encounters:       NPO Status:   Date of last liquid consumption: 12/04/17   Time of last liquid consumption: 0100   Date of last solid food consumption: 12/02/17      Time of last solid consumption: 1800       Anesthesia Evaluation  Patient summary reviewed and Nursing notes reviewed  Airway: Mallampati: II  TM distance: >3 FB     Mouth opening: > = 3 FB Dental: normal exam         Pulmonary:normal exam  breath sounds clear to auscultation  (+) sleep apnea: on noncompliant,  asthma:     (-) pneumonia, COPD and not a current smoker                           Cardiovascular:  Exercise tolerance: good (>4 METS),   (+) hypertension:,     (-) past MI, CABG/stent,  angina and  CHF      Rhythm: regular  Rate: normal           Beta Blocker:  Dose within 24 Hrs         Neuro/Psych:   Negative Neuro/Psych ROS     (-) seizures, neuromuscular disease, TIA, CVA, headaches, psychiatric history and depression/anxiety            GI/Hepatic/Renal:   (+) GERD:, bowel prep,      (-) PUD, liver disease and no renal disease       Endo/Other:    (+) blood dyscrasia (polycythemia vera)::., .    (-) arthritis               Abdominal:         (-) obese Abdomen: soft. Vascular:   + DVT, .  - PVD and PE. Anesthesia Plan      MAC     ASA 3       Induction: intravenous. MIPS: Prophylactic antiemetics administered. Anesthetic plan and risks discussed with patient. Plan discussed with CRNA. This pre-anesthesia assessment may be used as a history and physical.    DOS STAFF ADDENDUM:    Pt seen and examined, chart reviewed (including anesthesia, drug and allergy history). No interval changes to history and physical examination. Anesthetic plan, risks, benefits, alternatives, and personnel involved discussed with patient. Patient verbalized an understanding and agrees to proceed.       Dino Salamanca MD  December 4, 2017  6:47 AM

## 2017-12-04 NOTE — ANESTHESIA POST-OP
[facility] Department of Anesthesiology  Post-Anesthesia Note       Name:  Veronika Perez                                  Age:  55 y.o. MRN:  [id number]     Last Vitals & Oxygen Saturation: BP (!) 157/93   Pulse 98   Temp 97 °F (36.1 °C) (Temporal)   Resp 16   Ht 6' (1.829 m)   Wt 258 lb (117 kg)   SpO2 100%   BMI 34.99 kg/m²   Patient Vitals for the past 4 hrs:   BP Temp Temp src Pulse Resp SpO2 Height Weight   12/04/17 0805 (!) 157/93 - - 98 16 100 % - -   12/04/17 0755 (!) 154/88 - Oral 92 16 100 % - -   12/04/17 0745 (!) 148/93 97 °F (36.1 °C) Temporal 90 16 99 % - -   12/04/17 0641 (!) 164/100 97.5 °F (36.4 °C) Temporal 85 17 100 % 6' (1.829 m) 258 lb (117 kg)       Level of consciousness:  Awake, alert    Respiratory: Respirations easy, no distress. Stable. Cardiovascular: Hemodynamically stable. Hydration: Adequate. PONV: Adequately managed. Post-op pain: Adequately controlled. Post-op assessment: Tolerated anesthetic well without complication. Complications:  None.     Emma Valenzuela MD  December 4, 2017   10:08 AM

## 2017-12-06 ENCOUNTER — HOSPITAL ENCOUNTER (OUTPATIENT)
Dept: PHYSICAL THERAPY | Age: 57
Discharge: HOME | End: 2017-12-06
Admitting: EMERGENCY MEDICINE

## 2017-12-06 NOTE — FLOWSHEET NOTE
[facility] [employer]      Physical Therapy Re-Certification Plan of Care/MD UPDATE. Physical Therapy Daily Treatment Note  Date:  2017    Patient Name:  Yosef Marin    :  1960  MRN: [id number]  Restrictions/Precautions:    Medical/Treatment Diagnosis Information:  · Diagnosis: M51.16 (ICD-10-CM) - Lumbar disc disease with radiculopathy  · Treatment Diagnosis: M51.16 (ICD-10-CM) - Lumbar disc disease with radiculopathy  Insurance/Certification information:     Physician Information:  Referring Practitioner: Dr Susana Gomez of care signed (Y/N):     Date of Patient follow up with Physician:     G-Code (if applicable):      Date G-Code Applied:    PT G-Codes  Functional Assessment Tool Used: RILEY  Score: 10%  Functional Limitation: Mobility: Walking and moving around  Mobility: Walking and Moving Around Current Status (): At least 1 percent but less than 20percent impaired, limited or restricted  Mobility: Walking and Moving Around Goal Status (): At least 1 percent but less than 20 percent impaired, limited or restricted    Progress Note: []  Yes  []  No  Next due by: Visit #10      Latex Allergy:  [x]NO      []YES   Preferred Language for Healthcare:   [x]English       []other:     Visit # Insurance Allowable        Pain level:  0-5/10     SUBJECTIVE: Had EGD/colonoscopy on Monday. Walking tolerance has improved,  Minimal Numbness in R Le.         OBJECTIVE: pain with extension and R Sb, NT into R Le, hip abd 4/5  Observation:   Test measurements:      RESTRICTIONS/PRECAUTIONS: R hip flexor, Lateral hip pain and LE tingling    Exercises/Interventions:     Therapeutic Ex Wt / Resistance sets/sec reps notes   Treadmill 1.5 mph  10 min    Bridge 2-1  2 10    Kneeling Alt Arm-Leg       Side lying LB rot       Front Plank       Retro lunge with slide  1 15    Kneeling hip abd/ext       Standing TB pull  grey 1 15 proprioception of core, proximal hip and LE for self care, mobility, lifting, and ambulation      Manual Treatments:  PROM / STM / Oscillations-Mobs:  G-I, II, III, IV (PA's, Inf., Post.)  [x] (61582) Provided manual therapy to mobilize proximal hip and LS spine soft tissue/joints for the purpose of modulating pain, promoting relaxation,  increasing ROM, reducing/eliminating soft tissue swelling/inflammation/restriction, improving soft tissue extensibility and allowing for proper ROM for normal function with self care, mobility, lifting and ambulation. Modalities:       Charges:  Timed Code Treatment Minutes: 45   Total Treatment Minutes: 45     [] EVAL  [x] AL(69072) x  1   [] IONTO  [x] NMR (51758) x  1   [] VASO  [x] Manual (17671) x  1    [] Other:  [] TA x       [] Mech Traction (88870)  [] ES(attended) (60171)      [] ES (un) (93900):     Goals:     1. Independent in HEP and progression per patient tolerance, in order to prevent re-injury. 2. Patient will have a decrease in pain to facilitate improvement in movement, function, and ADLs as indicated by Functional Deficits. Long Term Goals: To be achieved in: 4 weeks  1. Disability index score of 15% or less for the RILEY to assist with reaching prior level of function. 100% MET  2. Patient will demonstrate increased AROM to WNL, good LS mobility, good hip ROM to allow for proper joint functioning as indicated by patients Functional Deficits. 75% MET  3. Patient will demonstrate an increase in Strength to good proximal hip and core activation to allow for proper functional mobility as indicated by patients Functional Deficits. 75% MET  4. Patient will return to walk functional activities without increased symptoms or restriction. 50% MET   5. Golf with no pain(patient specific functional goal)   Not met    Progression Towards Functional goals:  [x] Patient is progressing as expected towards functional goals listed.     [] Progression is slowed due to complexities listed. [] Progression has been slowed due to co-morbidities. [] Plan just implemented, too soon to assess goals progression  [] Other:     ASSESSMENT:  Patient had improved endurance and ROM , decreased pain with sidebending after DTM to TFL. Endurance is greatly limited at this point.          Treatment/Activity Tolerance:  [] Patient tolerated treatment well [] Patient limited by fatique  [] Patient limited by pain  [] Patient limited by other medical complications  [] Other:     Prognosis: [] Good [] Fair  [] Poor    Patient Requires Follow-up: [x] Yes  [] No    PLAN: Cont 1-2 x per week for 3 more weeks  [] Continue per plan of care [] Alter current plan (see comments)  [x] Plan of care initiated [] Hold pending MD visit [] Discharge    Electronically signed by: Debi Tomlinson PT

## 2017-12-08 ENCOUNTER — HOSPITAL ENCOUNTER (OUTPATIENT)
Dept: PHYSICAL THERAPY | Age: 57
Discharge: HOME | End: 2017-12-08
Admitting: EMERGENCY MEDICINE

## 2017-12-08 NOTE — FLOWSHEET NOTE
of core, proximal hip and LE for self care, mobility, lifting, and ambulation      Manual Treatments:  PROM / STM / Oscillations-Mobs:  G-I, II, III, IV (PA's, Inf., Post.)  [x] (57592) Provided manual therapy to mobilize proximal hip and LS spine soft tissue/joints for the purpose of modulating pain, promoting relaxation,  increasing ROM, reducing/eliminating soft tissue swelling/inflammation/restriction, improving soft tissue extensibility and allowing for proper ROM for normal function with self care, mobility, lifting and ambulation. Dry needling manual therapy: consisted on the placement of 3 needles in the following muscles:  TFL, glute med,  . A 60 mm needle was inserted, piston, rotated, and coned to produce intramuscular mobilization. These techniques were used to restore functional range of motion, reduce muscle spasm and induce healing in the corresponding musculature. (93183)  Clean Technique was utilized today while applying Dry needling treatment. The treatment sites where cleaned with 70% solution of  isopropyl alcohol . The PT washed their hands and utilized treatment gloves along with hand  prior to inserting the needles. All needles where removed and discarded in the appropriate sharps container. Modalities:       Charges:  Timed Code Treatment Minutes: 45   Total Treatment Minutes: 45     [] EVAL  [x] ER(53298) x  1   [] IONTO  [x] NMR (56884) x  1   [] VASO  [x] Manual (91234) x  1    [] Other:  [] TA x       [] Mech Traction (81650)  [] ES(attended) (46334)      [] ES (un) (23877):     Goals:     1. Independent in HEP and progression per patient tolerance, in order to prevent re-injury. 2. Patient will have a decrease in pain to facilitate improvement in movement, function, and ADLs as indicated by Functional Deficits. Long Term Goals: To be achieved in: 4 weeks  1. Disability index score of 15% or less for the RILEY to assist with reaching prior level of function.

## 2017-12-22 ENCOUNTER — HOSPITAL ENCOUNTER (OUTPATIENT)
Dept: PHYSICAL THERAPY | Age: 57
Discharge: HOME | End: 2017-12-22
Admitting: EMERGENCY MEDICINE

## 2017-12-22 NOTE — FLOWSHEET NOTE
[place] , [facility]      Physical Therapy Re-Certification Plan of Care/MD UPDATE. Physical Therapy Daily Treatment Note  Date:  2017    Patient Name:  Gabriella Vincent    :  1960  MRN: [id number]  Restrictions/Precautions:    Medical/Treatment Diagnosis Information:  · Diagnosis: M51.16 (ICD-10-CM) - Lumbar disc disease with radiculopathy  · Treatment Diagnosis: M51.16 (ICD-10-CM) - Lumbar disc disease with radiculopathy  Insurance/Certification information:     Physician Information:  Referring Practitioner: Dr Cory Bass of care signed (Y/N):     Date of Patient follow up with Physician:     G-Code (if applicable):      Date G-Code Applied:    PT G-Codes  Functional Assessment Tool Used: RILEY  Score: 10%  Functional Limitation: Mobility: Walking and moving around  Mobility: Walking and Moving Around Current Status (): At least 1 percent but less than 20percent impaired, limited or restricted  Mobility: Walking and Moving Around Goal Status (): At least 1 percent but less than 20 percent impaired, limited or restricted    Progress Note: []  Yes  []  No  Next due by: Visit #10      Latex Allergy:  [x]NO      []YES   Preferred Language for Healthcare:   [x]English       []other:     Visit # Insurance Allowable   14  12     Pain level:  0-5/10     SUBJECTIVE: Patient reports doing better than last session.   He reports decreased pain after last session with MT.      OBJECTIVE: pain with extension and R Sb, NT into R Le, hip abd 4/5  Observation:   Test measurements:      RESTRICTIONS/PRECAUTIONS: R hip flexor, Lateral hip pain and LE tingling    Exercises/Interventions:     Therapeutic Ex Wt / Resistance sets/sec reps notes   Treadmill 1.5 mph  10 min    Bridge 2-1  2 10    Kneeling Alt Arm-Leg       Side lying LB rot       Front Plank       Retro lunge with slide  1 15    Kneeling hip abd/ext       Standing TB pull  grey 1 15 Lateral step up    Reverse clam/clam  2 12    Fig 4 st supine KAYY  1 15    Ant hip flexor st Prone on table 30 sec 5     Belt PPT  2 12    SLS with glute med act  5 sec 15    Hip abd  3 8           Manual Intervention               Prone PA  T12/L1,L5  12 min  Min change with R SB after PA   GISTM/STM Hip flexor/TFL 8 min     Pro ne Fig 4  3 min     Lumbar rot mob L5-S1 8 min     Hip belt mobs       Hip LA distraction with medial glide  2 min     DN to R TFL/Glute med 18 min      NMR re-education         Sidely/supine clam w manual resist     SLS rebounder  1 10    Sidely pelvic PNF  1 15    Sidely hip abd with pelvic stabilization  SLS airex       Glut med at wall  5 sec 15    BOSU lunge  1 15      Therapeutic Exercise and NMR EXR  [x] (88845) Provided verbal/tactile cueing for activities related to strengthening, flexibility, endurance, ROM  for improvements in proximal hip and core control with self care, mobility, lifting and ambulation. [x] (24015) Provided verbal/tactile cueing for activities related to improving balance, coordination, kinesthetic sense, posture, motor skill, proprioception  to assist with core control in self care, mobility, lifting, and ambulation.      Therapeutic Activities:    [x] (95068 or 10053) Provided verbal/tactile cueing for activities related to improving balance, coordination, kinesthetic sense, posture, motor skill, proprioception and motor activation to allow for proper function  with self care and ADLs  [] (42608) Provided training and instruction to the patient for proper core and proximal hip recruitment and positioning with ambulation re-education     Home Exercise Program:    [x] (81413) Reviewed/Progressed HEP activities related to strengthening, flexibility, endurance, ROM of core, proximal hip and LE for functional self-care, mobility, lifting and ambulation   [] (91201) Reviewed/Progressed HEP activities related to improving balance, coordination, kinesthetic sense, posture, motor skill, proprioception of core, proximal hip and LE for self care, mobility, lifting, and ambulation      Manual Treatments:  PROM / STM / Oscillations-Mobs:  G-I, II, III, IV (PA's, Inf., Post.)  [x] (16773) Provided manual therapy to mobilize proximal hip and LS spine soft tissue/joints for the purpose of modulating pain, promoting relaxation,  increasing ROM, reducing/eliminating soft tissue swelling/inflammation/restriction, improving soft tissue extensibility and allowing for proper ROM for normal function with self care, mobility, lifting and ambulation. Dry needling manual therapy: consisted on the placement of 3 needles in the following muscles:  TFL, glute med,  . A 60 mm needle was inserted, piston, rotated, and coned to produce intramuscular mobilization. These techniques were used to restore functional range of motion, reduce muscle spasm and induce healing in the corresponding musculature. (48521)  Clean Technique was utilized today while applying Dry needling treatment. The treatment sites where cleaned with 70% solution of  isopropyl alcohol . The PT washed their hands and utilized treatment gloves along with hand  prior to inserting the needles. All needles where removed and discarded in the appropriate sharps container. Modalities:       Charges:  Timed Code Treatment Minutes: 45   Total Treatment Minutes: 45     [] EVAL  [x] JI(77945) x  1   [] IONTO  [] NMR (72587) x      [] VASO  [x] Manual (40042) x  2    [] Other:  [] TA x       [] Mech Traction (66122)  [] ES(attended) (01574)      [] ES (un) (52593):     Goals:     1. Independent in HEP and progression per patient tolerance, in order to prevent re-injury. 2. Patient will have a decrease in pain to facilitate improvement in movement, function, and ADLs as indicated by Functional Deficits. Long Term Goals: To be achieved in: 4 weeks  1.  Disability index score of 15% or less for the RILEY to assist

## 2017-12-27 ENCOUNTER — HOSPITAL ENCOUNTER (OUTPATIENT)
Dept: PHYSICAL THERAPY | Age: 57
Discharge: HOME | End: 2017-12-27
Admitting: EMERGENCY MEDICINE

## 2017-12-27 NOTE — FLOWSHEET NOTE
pull  grey 1 15    Lateral step up    Reverse clam/clam  2 12    Fig 4 st supine KAYY  1 15    Ant hip flexor st Prone on table 30 sec 5     Belt PPT  2 12    SLS with glute med act  5 sec 15    Hip abd  3 8           Manual Intervention               Prone PA  T12/L1,L5  12 min  Min change with R SB after PA   GISTM/STM Hip flexor/TFL 0min     Pro ne Fig 4  0min     Lumbar rot mob L5-S1 0 min     Hip belt mobs       Hip LA distraction with medial glide  0 min     DN to R TFL/Glute med 18 min      NMR re-education         Sidely/supine clam w manual resist     SLS rebounder  1 10    Sidely pelvic PNF  1 15    Sidely hip abd with pelvic stabilization  SLS airex       Glut med at wall  5 sec 15    BOSU lunge  1 15      Therapeutic Exercise and NMR EXR  [x] (58937) Provided verbal/tactile cueing for activities related to strengthening, flexibility, endurance, ROM  for improvements in proximal hip and core control with self care, mobility, lifting and ambulation. [x] (70741) Provided verbal/tactile cueing for activities related to improving balance, coordination, kinesthetic sense, posture, motor skill, proprioception  to assist with core control in self care, mobility, lifting, and ambulation.      Therapeutic Activities:    [x] (26254 or 80489) Provided verbal/tactile cueing for activities related to improving balance, coordination, kinesthetic sense, posture, motor skill, proprioception and motor activation to allow for proper function  with self care and ADLs  [] (07306) Provided training and instruction to the patient for proper core and proximal hip recruitment and positioning with ambulation re-education     Home Exercise Program:    [x] (47929) Reviewed/Progressed HEP activities related to strengthening, flexibility, endurance, ROM of core, proximal hip and LE for functional self-care, mobility, lifting and ambulation   [] (89975) Reviewed/Progressed HEP activities related to improving balance, coordination, RILEY to assist with reaching prior level of function. 100% MET  2. Patient will demonstrate increased AROM to WNL, good LS mobility, good hip ROM to allow for proper joint functioning as indicated by patients Functional Deficits. 75% MET  3. Patient will demonstrate an increase in Strength to good proximal hip and core activation to allow for proper functional mobility as indicated by patients Functional Deficits. 75% MET  4. Patient will return to walk functional activities without increased symptoms or restriction. 50% MET   5. Golf with no pain(patient specific functional goal)   Not met    Progression Towards Functional goals:  [x] Patient is progressing as expected towards functional goals listed. [] Progression is slowed due to complexities listed. [] Progression has been slowed due to co-morbidities. [] Plan just implemented, too soon to assess goals progression  [] Other:     ASSESSMENT:  Patient had decreased R lateral leg pain post man therapy to lumbar spine and DN. He had min difficulty with hip abd.   Progress as mary    Treatment/Activity Tolerance:  [] Patient tolerated treatment well [] Patient limited by fatique  [] Patient limited by pain  [] Patient limited by other medical complications  [] Other:     Prognosis: [] Good [] Fair  [] Poor    Patient Requires Follow-up: [x] Yes  [] No    PLAN: Cont 1-2 x per week for 3 more weeks  [] Continue per plan of care [] Alter current plan (see comments)  [x] Plan of care initiated [] Hold pending MD visit [] Discharge    Electronically signed by: Otis Babinski PT

## 2018-01-01 ENCOUNTER — HOSPITAL ENCOUNTER (OUTPATIENT)
Dept: PHYSICAL THERAPY | Age: 58
Discharge: HOME | End: 2018-01-31
Attending: EMERGENCY MEDICINE | Admitting: EMERGENCY MEDICINE

## 2018-01-10 ENCOUNTER — HOSPITAL ENCOUNTER (OUTPATIENT)
Dept: PHYSICAL THERAPY | Age: 58
Discharge: HOME | End: 2018-01-10
Admitting: EMERGENCY MEDICINE

## 2018-01-10 NOTE — FLOWSHEET NOTE
pull  grey 1 15    Lateral step up    Reverse clam/clam  2 12    Fig 4 st supine KAYY  1 15    Ant hip flexor st Prone on table 30 sec 5     Belt PPT  2 12    SLS with glute med act  5 sec 15    Hip abd  3 8    SLS        Manual Intervention               Prone PA  T12/L1,L5    Min change with R SB after PA   GISTM/STM Hip flexor/TFL 15min     Pro ne Fig 4  0min     Lumbar rot mob L5-S1 0 min     Hip belt mobs       Hip LA distraction with medial glide  0 min     DN to R TFL/Glute med 18 min      NMR re-education         Sidely/supine clam w manual resist     SLS rebounder  1 10    Sidely pelvic PNF  1 15    Sidely hip abd with pelvic stabilization  SLS airex       Glut med at wall  5 sec 15    BOSU lunge  1 15      Therapeutic Exercise and NMR EXR  [x] (59368) Provided verbal/tactile cueing for activities related to strengthening, flexibility, endurance, ROM  for improvements in proximal hip and core control with self care, mobility, lifting and ambulation. [x] (10548) Provided verbal/tactile cueing for activities related to improving balance, coordination, kinesthetic sense, posture, motor skill, proprioception  to assist with core control in self care, mobility, lifting, and ambulation.      Therapeutic Activities:    [x] (59938 or 06647) Provided verbal/tactile cueing for activities related to improving balance, coordination, kinesthetic sense, posture, motor skill, proprioception and motor activation to allow for proper function  with self care and ADLs  [] (46274) Provided training and instruction to the patient for proper core and proximal hip recruitment and positioning with ambulation re-education     Home Exercise Program:    [x] (56446) Reviewed/Progressed HEP activities related to strengthening, flexibility, endurance, ROM of core, proximal hip and LE for functional self-care, mobility, lifting and ambulation   [] (51012) Reviewed/Progressed HEP activities related to improving balance, coordination, kinesthetic sense, posture, motor skill, proprioception of core, proximal hip and LE for self care, mobility, lifting, and ambulation      Manual Treatments:  PROM / STM / Oscillations-Mobs:  G-I, II, III, IV (PA's, Inf., Post.)  [x] (47321) Provided manual therapy to mobilize proximal hip and LS spine soft tissue/joints for the purpose of modulating pain, promoting relaxation,  increasing ROM, reducing/eliminating soft tissue swelling/inflammation/restriction, improving soft tissue extensibility and allowing for proper ROM for normal function with self care, mobility, lifting and ambulation. Dry needling manual therapy: consisted on the placement of 3 needles in the following muscles:  TFL, glute med,  . A 60 mm needle was inserted, piston, rotated, and coned to produce intramuscular mobilization. These techniques were used to restore functional range of motion, reduce muscle spasm and induce healing in the corresponding musculature. (47302)  Clean Technique was utilized today while applying Dry needling treatment. The treatment sites where cleaned with 70% solution of  isopropyl alcohol . The PT washed their hands and utilized treatment gloves along with hand  prior to inserting the needles. All needles where removed and discarded in the appropriate sharps container. Modalities:       Charges:  Timed Code Treatment Minutes: 35   Total Treatment Minutes: 35     [] EVAL  [x] NL(66065) x  1   [] IONTO  [] NMR (37479) x      [] VASO  [x] Manual (17246) x  1    [] Other:  [] TA x       [] Mech Traction (33393)  [] ES(attended) (58158)      [] ES (un) (03049):     Goals:     1. Independent in HEP and progression per patient tolerance, in order to prevent re-injury. 2. Patient will have a decrease in pain to facilitate improvement in movement, function, and ADLs as indicated by Functional Deficits. Long Term Goals: To be achieved in: 4 weeks  1.  Disability index score of 15% or less for the

## 2018-02-01 ENCOUNTER — HOSPITAL ENCOUNTER (OUTPATIENT)
Dept: PHYSICAL THERAPY | Age: 58
Discharge: HOME | End: 2018-02-28
Attending: EMERGENCY MEDICINE | Admitting: EMERGENCY MEDICINE

## 2018-05-17 ENCOUNTER — HOSPITAL ENCOUNTER (OUTPATIENT)
Dept: PHYSICAL THERAPY | Age: 58
Discharge: HOME | End: 2018-05-31
Admitting: ORTHOPAEDIC SURGERY

## 2018-05-24 ENCOUNTER — HOSPITAL ENCOUNTER (OUTPATIENT)
Dept: PHYSICAL THERAPY | Age: 58
Discharge: HOME | End: 2018-05-25
Admitting: ORTHOPAEDIC SURGERY

## 2018-05-29 ENCOUNTER — HOSPITAL ENCOUNTER (OUTPATIENT)
Dept: PHYSICAL THERAPY | Age: 58
Discharge: HOME | End: 2018-05-30
Admitting: ORTHOPAEDIC SURGERY

## 2018-05-31 ENCOUNTER — HOSPITAL ENCOUNTER (OUTPATIENT)
Dept: PHYSICAL THERAPY | Age: 58
Discharge: HOME | End: 2018-06-30
Admitting: ORTHOPAEDIC SURGERY

## 2018-06-01 ENCOUNTER — HOSPITAL ENCOUNTER (OUTPATIENT)
Dept: PHYSICAL THERAPY | Age: 58
Discharge: HOME | End: 2018-06-01
Attending: ORTHOPAEDIC SURGERY | Admitting: ORTHOPAEDIC SURGERY

## 2018-06-05 ENCOUNTER — HOSPITAL ENCOUNTER (OUTPATIENT)
Dept: PHYSICAL THERAPY | Age: 58
Discharge: HOME | End: 2018-06-06
Admitting: ORTHOPAEDIC SURGERY

## 2018-06-12 ENCOUNTER — HOSPITAL ENCOUNTER (OUTPATIENT)
Dept: PHYSICAL THERAPY | Age: 58
Discharge: HOME | End: 2018-06-13
Admitting: ORTHOPAEDIC SURGERY

## 2018-06-14 ENCOUNTER — HOSPITAL ENCOUNTER (OUTPATIENT)
Dept: PHYSICAL THERAPY | Age: 58
Discharge: HOME | End: 2018-06-15
Admitting: ORTHOPAEDIC SURGERY

## 2018-06-22 ENCOUNTER — HOSPITAL ENCOUNTER (OUTPATIENT)
Dept: PHYSICAL THERAPY | Age: 58
Discharge: HOME | End: 2018-06-23
Admitting: ORTHOPAEDIC SURGERY

## 2018-06-29 ENCOUNTER — HOSPITAL ENCOUNTER (OUTPATIENT)
Dept: PHYSICAL THERAPY | Age: 58
Discharge: HOME | End: 2018-07-31
Admitting: ORTHOPAEDIC SURGERY

## 2018-06-29 NOTE — FLOWSHEET NOTE
[address], [facility]    Physical Therapy Daily Treatment Note  Date:  2018    Patient Name:  Devang Hodges    :  1960  MRN: [id number]  Restrictions/Precautions:    Medical/Treatment Diagnosis Information:   · Diagnosis: s/p lumbar wound exploration and dural repair  · Treatment Diagnosis: low back pain, leg weakness  Insurance/Certification information:  PT Insurance Information: [facility], $1600 deductible, no copay, no visit limit as [place]  Physician Information:  Referring Practitioner: Randy Gomes MD  Plan of care signed (Y/N):     Date of Patient follow up with Physician:     G-Code (if applicable):      Date G-Code Applied:         Progress Note: []  Yes  []  No  Next due by: Visit #22, new Rx uploaded in media     Latex Allergy:  [x]NO      []YES  Preferred Language for Healthcare:   [x]English       []other:    Visit # Insurance Allowable   10 [place]     Pain level:  3/10     SUBJECTIVE:   Pt reports a mechanical fall where he caught himself in a half lunge with his right leg back and feels that he strained his hip flexor and quad on the R side. Having trouble with stairs and lifting his leg.       OBJECTIVE:   Observation:   Test measurements:      RESTRICTIONS/PRECAUTIONS: core and BLE weakness    Exercises/Interventions:     Therapeutic Ex  Wt / Resistance sets/sec reps notes   Bike  6 min     SLR supine 1# 3 10    Quad Sets   SAQ    10\"  3/3\" 10  10    LTR  5\" 20x    Sciatic nerve glides  1 30    sidelying abduction SLR 1# 2 10    Clamshells  2 10    Heel slides  3 10    Leg press, B  SL leg press, R 100#  40# 3 10    sidestepping orange 2 10 Around knees   TB rows/ext blue 3 10    MH abduction 20# 1 30           Manual Intervention 13'       Manual stretching 8min   Prone quad, hip ER/IR   Prone PA       GISTM/STM 5min      Lumbar Manip       SI Manip       Hip belt mobs       Hip LA distraction              NMR re-education 10'       Mf normal function with self care, mobility, lifting and ambulation. Spoke with   regarding the use of Dry Needling     Dry needling manual therapy: consisted on the placement of 5 needles in the following muscles:  hematoma. A 50 mm needle was inserted, piston, rotated, and coned to produce intramuscular mobilization. These techniques were used to restore functional range of motion, reduce muscle spasm and induce healing in the corresponding musculature. (03283)  Clean Technique was utilized today while applying Dry needling treatment. The treatment sites where cleaned with 70% solution of  isopropyl alcohol . The PT washed their hands and utilized treatment gloves along with hand  prior to inserting the needles. All needles where removed and discarded in the appropriate sharps container. MD has given verbal and/or written approval for this treatment. Modalities:ice x 15 min    Charges:  Timed Code Treatment Minutes: 50   Total Treatment Minutes: 65     [] EVAL  [x] BH(90277) x  2   [] IONTO  [x] NMR (01964) x  1   [] VASO  [x] Manual (62415) x  1    [] Other:  [] TA x       [] Mech Traction (19276)  [] ES(attended) (87876)      [] ES (un) (66666):     Goals:   Patient stated goal: increase standing and walking endurance     Therapist goals for Patient:   Short Term Goals: To be achieved in: 2 weeks  1. Independent in HEP and progression per patient tolerance, in order to prevent re-injury. 2. Patient will have a decrease in pain to facilitate improvement in movement, function, and ADLs as indicated by Functional Deficits.     Long Term Goals: To be achieved in: 8 weeks  1. Disability index score of 0% or less for the RILEY to assist with reaching prior level of function. 2. Patient will demonstrate increased AROM to WNL, good LS mobility, good hip ROM to allow for proper joint functioning as indicated by patients Functional Deficits.    3. Patient will demonstrate an increase in

## 2018-07-01 ENCOUNTER — HOSPITAL ENCOUNTER (OUTPATIENT)
Dept: PHYSICAL THERAPY | Age: 58
Discharge: HOME | End: 2018-07-01
Attending: ORTHOPAEDIC SURGERY | Admitting: ORTHOPAEDIC SURGERY

## 2018-07-02 ENCOUNTER — HOSPITAL ENCOUNTER (OUTPATIENT)
Dept: PHYSICAL THERAPY | Age: 58
Discharge: HOME | End: 2018-07-03
Admitting: ORTHOPAEDIC SURGERY

## 2018-07-02 NOTE — FLOWSHEET NOTE
[address], [facility]    Physical Therapy Daily Treatment Note  Date:  2018    Patient Name:  Devang Hodges    :  1960  MRN: [id number]  Restrictions/Precautions:    Medical/Treatment Diagnosis Information:   · Diagnosis: s/p lumbar wound exploration and dural repair  · Treatment Diagnosis: low back pain, leg weakness  Insurance/Certification information:  PT Insurance Information: [facility], $1600 deductible, no copay, no visit limit as [place]  Physician Information:  Referring Practitioner: Randy Gomes MD  Plan of care signed (Y/N):     Date of Patient follow up with Physician:     G-Code (if applicable):      Date G-Code Applied:         Progress Note: []  Yes  []  No  Next due by: Visit #22, new Rx uploaded in media     Latex Allergy:  [x]NO      []YES  Preferred Language for Healthcare:   [x]English       []other:    Visit # Insurance Allowable   11 [place]     Pain level:  3/10     SUBJECTIVE:   Pt reports minimal aching in the back of the leg and in the glu    OBJECTIVE:   Observation:   Test measurements:      RESTRICTIONS/PRECAUTIONS: core and BLE weakness    Exercises/Interventions:     Therapeutic Ex 35 Wt / Resistance sets/sec reps notes   Bike  6 min     SLR supine 1# 3 10 Defer on R side d/t strain     SAQ    10\"  3/3\" 10  10  SAQ on R side only d/t strain   LTR  5\" 20x    Sciatic nerve glides  1 30    sidelying abduction SLR 1# 2 10    Clamshells  2 10    Heel slides  3 10    Leg press, B  SL leg press, R 100#  40# 3 10   HELD   sidestepping orange 2 10 Around knees   TB rows/ext blue 3 10    MH abduction 20# 1 30           Manual Intervention 8'       Manual stretching 8min   Prone quad, hip ER/IR   Prone PA       GISTM/STM       Lumbar Manip       SI Manip       Hip belt mobs       Hip LA distraction              NMR re-education 10'       Mf Activation- re-ed  3 10 Prone donkey kick   TrA Re-ed activation         Glute Max re-ed activation  3 the use of Dry Needling     Dry needling manual therapy: consisted on the placement of 5 needles in the following muscles:  hematoma. A 50 mm needle was inserted, piston, rotated, and coned to produce intramuscular mobilization. These techniques were used to restore functional range of motion, reduce muscle spasm and induce healing in the corresponding musculature. (56011)  Clean Technique was utilized today while applying Dry needling treatment. The treatment sites where cleaned with 70% solution of  isopropyl alcohol . The PT washed their hands and utilized treatment gloves along with hand  prior to inserting the needles. All needles where removed and discarded in the appropriate sharps container. MD has given verbal and/or written approval for this treatment. Modalities:ice x 10 min    Charges:  Timed Code Treatment Minutes: 45   Total Treatment Minutes: 55     [] EVAL  [x] TL(57700) x  2   [] IONTO  [x] NMR (63576) x  1   [] VASO  [x] Manual (00788) x  1    [] Other:  [] TA x       [] Mech Traction (07759)  [] ES(attended) (14269)      [] ES (un) (29107):     Goals:   Patient stated goal: increase standing and walking endurance     Therapist goals for Patient:   Short Term Goals: To be achieved in: 2 weeks  1. Independent in HEP and progression per patient tolerance, in order to prevent re-injury. 2. Patient will have a decrease in pain to facilitate improvement in movement, function, and ADLs as indicated by Functional Deficits.     Long Term Goals: To be achieved in: 8 weeks  1. Disability index score of 0% or less for the RILEY to assist with reaching prior level of function. 2. Patient will demonstrate increased AROM to WNL, good LS mobility, good hip ROM to allow for proper joint functioning as indicated by patients Functional Deficits.    3. Patient will demonstrate an increase in Strength to good proximal hip and core activation to allow for proper functional mobility as indicated by

## 2018-07-03 DIAGNOSIS — M54.50 ACUTE MIDLINE LOW BACK PAIN WITHOUT SCIATICA: Primary | ICD-10-CM

## 2018-07-03 PROCEDURE — MISC902 COLD PAC OVERSIZED: Performed by: ORTHOPAEDIC SURGERY

## 2018-07-05 ENCOUNTER — HOSPITAL ENCOUNTER (OUTPATIENT)
Dept: PHYSICAL THERAPY | Age: 58
Discharge: HOME | End: 2018-07-06
Admitting: ORTHOPAEDIC SURGERY

## 2018-07-05 NOTE — FLOWSHEET NOTE
[address], [facility]    Physical Therapy Daily Treatment Note  Date:  2018    Patient Name:  Cait Becker    :  1960  MRN: [id number]  Restrictions/Precautions:    Medical/Treatment Diagnosis Information:   · Diagnosis: s/p lumbar wound exploration and dural repair  · Treatment Diagnosis: low back pain, leg weakness  Insurance/Certification information:  PT Insurance Information: [facility], $1600 deductible, no copay, no visit limit as [place]  Physician Information:  Referring Practitioner: Fran Krause MD  Plan of care signed (Y/N):     Date of Patient follow up with Physician:     G-Code (if applicable):      Date G-Code Applied:         Progress Note: []  Yes  []  No  Next due by: Visit #22, new Rx uploaded in media     Latex Allergy:  [x]NO      []YES  Preferred Language for Healthcare:   [x]English       []other:    Visit # Insurance Allowable   12 [place]     Pain level:  6/10     SUBJECTIVE:   Pt reports a good amount of pain in the side and top of thigh. Feels like the leg could give out on him at any moment. Getting a lot of cramping in R leg lately in the glutes and ITB.      OBJECTIVE:   Observation:   Test measurements:      RESTRICTIONS/PRECAUTIONS: core and BLE weakness    Exercises/Interventions:     Therapeutic Ex 30 Wt / Resistance sets/sec reps notes   Bike  6 min     SLR supine 1# 3 10 Defer on R side d/t strain         10\"  3/3\" 10  10  SAQ on R side only d/t strain   LTR  5\" 20x    Sciatic nerve glides  1 30    sidelying abduction SLR 1# 2 10    Clamshells  2 10    Heel slides  3 10    Leg press, B  SL leg press, R 100#  40# 3 10   HELD   sidestepping orange 2 10 Around knees   TB rows/ext blue 3 10    MH abduction 30# 1 30           Manual Intervention 10'       Manual stretching 8min   Prone quad, hip ER/IR   Prone PA       GISTM/STM       Lumbar Manip       SI Manip       Hip belt mobs       Hip LA distraction              NMR

## 2018-08-01 ENCOUNTER — HOSPITAL ENCOUNTER (OUTPATIENT)
Dept: PHYSICAL THERAPY | Age: 58
Discharge: HOME | End: 2018-08-31
Attending: ORTHOPAEDIC SURGERY | Admitting: ORTHOPAEDIC SURGERY

## 2018-11-08 ENCOUNTER — HOSPITAL ENCOUNTER (OUTPATIENT)
Age: 58
Discharge: HOME | End: 2018-11-08
Payer: COMMERCIAL

## 2018-11-08 LAB
A/G RATIO: 1.3 (ref 1.1–2.2)
ALBUMIN SERPL-MCNC: 4.1 G/DL (ref 3.4–5)
ALP BLD-CCNC: 57 U/L (ref 40–129)
ALT SERPL-CCNC: 41 U/L (ref 10–40)
ANION GAP SERPL CALCULATED.3IONS-SCNC: 15 MMOL/L (ref 3–16)
AST SERPL-CCNC: 31 U/L (ref 15–37)
BASOPHILS ABSOLUTE: 0.1 K/UL (ref 0–0.2)
BASOPHILS RELATIVE PERCENT: 1.1 %
BILIRUB SERPL-MCNC: 0.3 MG/DL (ref 0–1)
BILIRUBIN URINE: ABNORMAL
BLOOD, URINE: NEGATIVE
BUN BLDV-MCNC: 16 MG/DL (ref 7–20)
CALCIUM SERPL-MCNC: 9.3 MG/DL (ref 8.3–10.6)
CASTS 2: ABNORMAL /LPF
CASTS: ABNORMAL /LPF
CHLORIDE BLD-SCNC: 97 MMOL/L (ref 99–110)
CLARITY: ABNORMAL
CO2: 25 MMOL/L (ref 21–32)
COLOR: ABNORMAL
CREAT SERPL-MCNC: 1.4 MG/DL (ref 0.9–1.3)
EOSINOPHILS ABSOLUTE: 0.3 K/UL (ref 0–0.6)
EOSINOPHILS RELATIVE PERCENT: 2.7 %
EPITHELIAL CELLS, UA: 3 /HPF (ref 0–5)
GFR AFRICAN AMERICAN: >60
GFR NON-AFRICAN AMERICAN: 52
GLOBULIN: 3.1 G/DL
GLUCOSE BLD-MCNC: 98 MG/DL (ref 70–99)
GLUCOSE URINE: NEGATIVE MG/DL
HCT VFR BLD CALC: 44.3 % (ref 40.5–52.5)
HEMOGLOBIN: 14.8 G/DL (ref 13.5–17.5)
KETONES, URINE: ABNORMAL MG/DL
LEUKOCYTE ESTERASE, URINE: ABNORMAL
LYMPHOCYTES ABSOLUTE: 3 K/UL (ref 1–5.1)
LYMPHOCYTES RELATIVE PERCENT: 30.1 %
MCH RBC QN AUTO: 29.5 PG (ref 26–34)
MCHC RBC AUTO-ENTMCNC: 33.4 G/DL (ref 31–36)
MCV RBC AUTO: 88.4 FL (ref 80–100)
MICROSCOPIC EXAMINATION: YES
MONOCYTES ABSOLUTE: 1.2 K/UL (ref 0–1.3)
MONOCYTES RELATIVE PERCENT: 12.3 %
NEUTROPHILS ABSOLUTE: 5.4 K/UL (ref 1.7–7.7)
NEUTROPHILS RELATIVE PERCENT: 53.8 %
NITRITE, URINE: NEGATIVE
PARATHYROID HORMONE INTACT: 50.9 PG/ML (ref 14–72)
PDW BLD-RTO: 15.1 % (ref 12.4–15.4)
PH UA: 5.5
PHOSPHORUS: 3.7 MG/DL (ref 2.5–4.9)
PLATELET # BLD: 375 K/UL (ref 135–450)
PMV BLD AUTO: 8.4 FL (ref 5–10.5)
POTASSIUM SERPL-SCNC: 3.6 MMOL/L (ref 3.5–5.1)
PROTEIN UA: 30 MG/DL
RBC # BLD: 5.02 M/UL (ref 4.2–5.9)
RBC UA: 7 /HPF (ref 0–4)
SODIUM BLD-SCNC: 137 MMOL/L (ref 136–145)
SPECIFIC GRAVITY UA: >1.03
TOTAL PROTEIN: 7.2 G/DL (ref 6.4–8.2)
URINE TYPE: ABNORMAL
UROBILINOGEN, URINE: 0.2 E.U./DL
WBC # BLD: 9.9 K/UL (ref 4–11)
WBC UA: 17 /HPF (ref 0–5)

## 2018-11-08 PROCEDURE — 81001 URINALYSIS AUTO W/SCOPE: CPT

## 2018-11-08 PROCEDURE — 84100 ASSAY OF PHOSPHORUS: CPT

## 2018-11-08 PROCEDURE — 36415 COLL VENOUS BLD VENIPUNCTURE: CPT

## 2018-11-08 PROCEDURE — 80053 COMPREHEN METABOLIC PANEL: CPT

## 2018-11-08 PROCEDURE — 85025 COMPLETE CBC W/AUTO DIFF WBC: CPT

## 2018-11-08 PROCEDURE — 83970 ASSAY OF PARATHORMONE: CPT

## 2020-01-06 RX ORDER — CLONIDINE HYDROCHLORIDE 0.2 MG/1
0.1 TABLET ORAL
COMMUNITY

## 2020-01-06 RX ORDER — ASPIRIN 81 MG/1
81 TABLET ORAL
COMMUNITY
Start: 2018-04-11

## 2020-01-06 RX ORDER — GEMFIBROZIL 600 MG/1
600 TABLET, FILM COATED ORAL
COMMUNITY
Start: 2018-06-07

## 2020-01-06 NOTE — PROGRESS NOTES
them.     If you have a living will and a durable power of  for healthcare, please bring in a copy. As part of our patient safety program to minimize surgical site infections, we ask you to do the following:    · Please notify your surgeon if you develop any illness between         now and the  day of your surgery. · This includes a cough, cold, fever, sore throat, nausea,         or vomiting, and diarrhea, etc.  ·  Please notify your surgeon if you experience dizziness, shortness         of breath or blurred vision between now and the time of your surgery. You may shower the night before surgery or the morning of   your surgery with an antibacterial soap. You will need to bring a photo ID and insurance card    [facility] has an onsite pharmacy, would you like to utilize our pharmacy     If you will be staying overnight and use a C-pap machine, please bring   your C-pap to hospital     Our goal is to provide you with excellent care, therefore, visitors will be limited to two(2) in the room at a time so that we may focus on providing this care for you. Please contact pre-admission testing if you have any further questions. [facility] phone number:  [phone]  Please note these are generalized instructions for all surgical cases, you may be provided with more specific instructions according to your surgery.

## 2020-01-09 ENCOUNTER — ANESTHESIA EVENT (OUTPATIENT)
Dept: ENDOSCOPY | Age: 60
End: 2020-01-09
Payer: COMMERCIAL

## 2020-01-10 ENCOUNTER — HOSPITAL ENCOUNTER (OUTPATIENT)
Age: 60
Setting detail: OUTPATIENT SURGERY
Discharge: HOME | End: 2020-01-10
Attending: INTERNAL MEDICINE | Admitting: INTERNAL MEDICINE
Payer: COMMERCIAL

## 2020-01-10 ENCOUNTER — ANESTHESIA (OUTPATIENT)
Dept: ENDOSCOPY | Age: 60
End: 2020-01-10
Payer: COMMERCIAL

## 2020-01-10 VITALS
BODY MASS INDEX: 35.81 KG/M2 | HEART RATE: 79 BPM | OXYGEN SATURATION: 99 % | WEIGHT: 264.38 LBS | RESPIRATION RATE: 16 BRPM | SYSTOLIC BLOOD PRESSURE: 186 MMHG | DIASTOLIC BLOOD PRESSURE: 103 MMHG | HEIGHT: 72 IN | TEMPERATURE: 97.9 F

## 2020-01-10 VITALS — SYSTOLIC BLOOD PRESSURE: 139 MMHG | DIASTOLIC BLOOD PRESSURE: 98 MMHG | OXYGEN SATURATION: 96 %

## 2020-01-10 LAB
GLUCOSE BLD-MCNC: 126 MG/DL (ref 70–99)
PERFORMED ON: ABNORMAL

## 2020-01-10 PROCEDURE — 7100000011 HC PHASE II RECOVERY - ADDTL 15 MIN: Performed by: INTERNAL MEDICINE

## 2020-01-10 PROCEDURE — 6360000002 HC RX W HCPCS: Performed by: NURSE ANESTHETIST, CERTIFIED REGISTERED

## 2020-01-10 PROCEDURE — 3609010300 HC COLONOSCOPY W/BIOPSY SINGLE/MULTIPLE: Performed by: INTERNAL MEDICINE

## 2020-01-10 PROCEDURE — 7100000010 HC PHASE II RECOVERY - FIRST 15 MIN: Performed by: INTERNAL MEDICINE

## 2020-01-10 PROCEDURE — 3700000000 HC ANESTHESIA ATTENDED CARE: Performed by: INTERNAL MEDICINE

## 2020-01-10 PROCEDURE — 2500000003 HC RX 250 WO HCPCS: Performed by: ANESTHESIOLOGY

## 2020-01-10 PROCEDURE — 88305 TISSUE EXAM BY PATHOLOGIST: CPT

## 2020-01-10 PROCEDURE — 2580000003 HC RX 258: Performed by: ANESTHESIOLOGY

## 2020-01-10 PROCEDURE — 2709999900 HC NON-CHARGEABLE SUPPLY: Performed by: INTERNAL MEDICINE

## 2020-01-10 RX ORDER — PROPOFOL 10 MG/ML
INJECTION, EMULSION INTRAVENOUS PRN
Status: DISCONTINUED | OUTPATIENT
Start: 2020-01-10 | End: 2020-01-10 | Stop reason: SDUPTHER

## 2020-01-10 RX ORDER — SODIUM CHLORIDE 9 MG/ML
INJECTION, SOLUTION INTRAVENOUS CONTINUOUS
Status: DISCONTINUED | OUTPATIENT
Start: 2020-01-10 | End: 2020-01-10 | Stop reason: HOSPADM

## 2020-01-10 RX ORDER — LABETALOL HYDROCHLORIDE 5 MG/ML
10 INJECTION, SOLUTION INTRAVENOUS ONCE
Status: COMPLETED | OUTPATIENT
Start: 2020-01-10 | End: 2020-01-10

## 2020-01-10 RX ORDER — ONDANSETRON 2 MG/ML
4 INJECTION INTRAMUSCULAR; INTRAVENOUS
Status: DISCONTINUED | OUTPATIENT
Start: 2020-01-10 | End: 2020-01-10 | Stop reason: HOSPADM

## 2020-01-10 RX ORDER — SODIUM CHLORIDE 0.9 % (FLUSH) 0.9 %
10 SYRINGE (ML) INJECTION EVERY 12 HOURS SCHEDULED
Status: DISCONTINUED | OUTPATIENT
Start: 2020-01-10 | End: 2020-01-10 | Stop reason: HOSPADM

## 2020-01-10 RX ORDER — LABETALOL HYDROCHLORIDE 5 MG/ML
15 INJECTION, SOLUTION INTRAVENOUS ONCE
Status: COMPLETED | OUTPATIENT
Start: 2020-01-10 | End: 2020-01-10

## 2020-01-10 RX ORDER — SODIUM CHLORIDE 0.9 % (FLUSH) 0.9 %
10 SYRINGE (ML) INJECTION PRN
Status: DISCONTINUED | OUTPATIENT
Start: 2020-01-10 | End: 2020-01-10 | Stop reason: HOSPADM

## 2020-01-10 RX ADMIN — PROPOFOL 50 MG: 10 INJECTION, EMULSION INTRAVENOUS at 10:30

## 2020-01-10 RX ADMIN — SODIUM CHLORIDE: 9 INJECTION, SOLUTION INTRAVENOUS at 09:40

## 2020-01-10 RX ADMIN — PROPOFOL 80 MG: 10 INJECTION, EMULSION INTRAVENOUS at 10:25

## 2020-01-10 RX ADMIN — SODIUM CHLORIDE: 9 INJECTION, SOLUTION INTRAVENOUS at 10:21

## 2020-01-10 RX ADMIN — PROPOFOL 40 MG: 10 INJECTION, EMULSION INTRAVENOUS at 10:27

## 2020-01-10 RX ADMIN — LABETALOL HYDROCHLORIDE 15 MG: 5 INJECTION, SOLUTION INTRAVENOUS at 10:14

## 2020-01-10 RX ADMIN — LABETALOL HYDROCHLORIDE 10 MG: 5 INJECTION, SOLUTION INTRAVENOUS at 09:56

## 2020-01-10 RX ADMIN — PROPOFOL 80 MG: 10 INJECTION, EMULSION INTRAVENOUS at 10:23

## 2020-01-10 ASSESSMENT — PAIN SCALES - GENERAL
PAINLEVEL_OUTOF10: 0

## 2020-01-10 ASSESSMENT — PAIN - FUNCTIONAL ASSESSMENT
PAIN_FUNCTIONAL_ASSESSMENT: 0-10
PAIN_FUNCTIONAL_ASSESSMENT: PREVENTS OR INTERFERES SOME ACTIVE ACTIVITIES AND ADLS

## 2020-01-10 ASSESSMENT — PAIN DESCRIPTION - DESCRIPTORS: DESCRIPTORS: DISCOMFORT

## 2020-01-10 NOTE — ANESTHESIA PRE PROCEDURE
anesthesia, drug and allergy history). No interval changes to history and physical examination. Anesthetic plan, risks, benefits, alternatives, and personnel involved discussed with patient. Patient verbalized an understanding and agrees to proceed.       Janneth German MD  January 10, 2020  9:13 AM

## 2020-01-10 NOTE — PROCEDURES
[facility]  Endoscopy Note    Patient: Cy Peraza  : 1960  Acct#: [de-identified]    Procedure: Colonoscopy with biopsy    Date:  1/10/2020    Surgeon:  Sameer Godinez MD    Referring Physician:  Klaus Irene    Previous Colonoscopy: Yes  Date: 17  Greater than 3 years? No    Preoperative Diagnosis:  surveillance    Postoperative Diagnosis:  Colon polyp at previous polypectomy site in ascending colon    Anesthesia:  See anesthesia note    Indications: This is a 63y.o. year old male who presents today with previous adenomatous polyp. Procedure: An informed consent was obtained from the patient after explanation of indications, benefits, possible risks and complications of the procedure. The patient was then taken to the endoscopy suite, placed in the left lateral decubitus position, and the above IV anesthesia was administered. A digital rectal examination was performed and revealed negative without mass, lesions or tenderness. The Olympus CFQ-180-AL video colonoscope was placed in the patient's rectum under digital direction and advanced to the cecum. The cecum was identified by characteristic anatomy and ballottment. The ileocecal valve was identified. The preparation was excellent. The scope was then withdrawn back through the cecum, ascending, transverse, descending and sigmoid colons. Carefull circumferential examination of the mucosa in these areas demonstrated a 4-6 mm polyp in the ascending colon that was biopsied and removed. It was at the previous polypectomy site that had been tattooed. The scope was then withdrawn into the rectum and retroflexed. The retroflexed view of the anal verge and rectum demonstrates no abnormalities. The scope was straightened, the colon was decompressed and the scope was withdrawn from the patient. The patient tolerated the procedure well and was taken to the PACU in good condition.     Estimated Blood Loss:  none    Impression: Colon

## 2020-01-10 NOTE — H&P
[facility]   Pre-operative History and Physical    Patient: Jesus Gambino  : 1960  Acct#: [de-identified]    History Obtained From: electronic medical record    HISTORY OF PRESENT ILLNESS  Procedure:Colonoscopy  Indications:surveillance  Past Medical History:        Diagnosis Date    Asthma     Cao's esophagus     Diabetes mellitus ([facility] [address]     Hx of blood clots     Hypertension     Kidney calculi     Polycythemia     1 month if needed    Polycythemia vera ([facility] [address]      Past Surgical History:        Procedure Laterality Date    BACK SURGERY      X3    COLONOSCOPY      KNEE ARTHROSCOPY      LITHOTRIPSY      TONSILLECTOMY       Medications prior to admission:   Prior to Admission medications    Medication Sig Start Date End Date Taking? Authorizing Provider   cloNIDine (CATAPRES) 0.2 MG tablet Take 0.1 mg by mouth   Yes Historical Provider, MD   gemfibrozil (LOPID) 600 MG tablet Take 600 mg by mouth 18  Yes Historical Provider, MD   aspirin 81 MG EC tablet Take 81 mg by mouth 18  Yes Historical Provider, MD   SPIRONOLACTONE PO Take by mouth   Yes Historical Provider, MD   potassium chloride (KLOR-CON M) 20 MEQ extended release tablet Take 1 tablet by mouth daily for 2 days 17 Yes Ray Gallo PA-C   metFORMIN (GLUCOPHAGE) 500 MG tablet Take 500 mg by mouth 2 times daily (with meals)   Yes Historical Provider, MD   esomeprazole (NEXIUM) 40 MG capsule Take 20 mg by mouth every morning (before breakfast)    Yes Historical Provider, MD   NIFEdipine (NIFEDIAC CC) 60 MG CR tablet Take 60 mg by mouth 2 times daily. 4/15/10  Yes Historical Provider, MD   metoprolol (TOPROL-XL) 200 MG XL tablet Take 200 mg by mouth 2 times daily    Yes Historical Provider, MD   albuterol (PROVENTIL HFA;VENTOLIN HFA) 108 (90 BASE) MCG/ACT inhaler Inhale 2 puffs into the lungs every 6 hours as needed.     Historical Provider, MD   triamcinolone (AZMACORT) 75 MCG/ACT inhaler Inhale 1 puff into the lungs 2 times daily.     Historical Provider, MD     Allergies:   Enalapril; No known allergies; and Zocor [simvastatin]    Social History     Socioeconomic History    Marital status:      Spouse name: Not on file    Number of children: Not on file    Years of education: Not on file    Highest education level: Not on file   Occupational History    Not on file   Social Needs    Financial resource strain: Not on file    Food insecurity:     Worry: Not on file     Inability: Not on file    Transportation needs:     Medical: Not on file     Non-medical: Not on file   Tobacco Use    Smoking status: Never Smoker    Smokeless tobacco: Never Used   Substance and Sexual Activity    Alcohol use: Yes     Comment: rare socially    Drug use: No    Sexual activity: Yes     Partners: Female     Comment:    Lifestyle    Physical activity:     Days per week: Not on file     Minutes per session: Not on file    Stress: Not on file   Relationships    Social connections:     Talks on phone: Not on file     Gets together: Not on file     Attends Sabianist service: Not on file     Active member of club or organization: Not on file     Attends meetings of clubs or organizations: Not on file     Relationship status: Not on file    Intimate partner violence:     Fear of current or ex partner: Not on file     Emotionally abused: Not on file     Physically abused: Not on file     Forced sexual activity: Not on file   Other Topics Concern    Not on file   Social History Narrative    Not on file     Family History   Problem Relation Age of Onset    Heart Disease Father     Diabetes Father     Heart Attack Father          PHYSICAL EXAM:      BP (!) 186/122   Pulse 88   Temp 97.8 °F (36.6 °C) (Temporal)   Resp 16   Ht 6' (1.829 m)   Wt 264 lb 6 oz (119.9 kg)   SpO2 99%   BMI 35.86 kg/m²  I        Heart:normal    Lungs: normal    Abdomen: normal      ASA Grade:  See anesthesia note      ASSESSMENT AND PLAN:    1. Procedure options, risks and benefits reviewed with patient and expresses understanding.

## 2021-12-08 NOTE — FLOWSHEET NOTE
[address], [facility]      Physical Therapy Re-Certification Plan of Care/MD UPDATE. Physical Therapy Daily Treatment Note  Date:  2017    Patient Name:  Yoel Osman    :  1960  MRN: [id number]  Restrictions/Precautions:    Medical/Treatment Diagnosis Information:  · Diagnosis: M51.16 (ICD-10-CM) - Lumbar disc disease with radiculopathy  · Treatment Diagnosis: M51.16 (ICD-10-CM) - Lumbar disc disease with radiculopathy  Insurance/Certification information:     Physician Information:  Referring Practitioner: Dr Nazanin James of care signed (Y/N):     Date of Patient follow up with Physician:     G-Code (if applicable):      Date G-Code Applied:    PT G-Codes  Functional Assessment Tool Used: RILEY  Score: 10%  Functional Limitation: Mobility: Walking and moving around  Mobility: Walking and Moving Around Current Status (): At least 1 percent but less than 20percent impaired, limited or restricted  Mobility: Walking and Moving Around Goal Status (): At least 1 percent but less than 20 percent impaired, limited or restricted    Progress Note: []  Yes  []  No  Next due by: Visit #10      Latex Allergy:  [x]NO      []YES   Preferred Language for Healthcare:   [x]English       []other:     Visit # Insurance Allowable    12     Pain level:  0-5/10     SUBJECTIVE:  Improved walking tolerance. No pain in hip   Walked at outlets yesterday, very fatigued but no pain.     OBJECTIVE: pain with extension and R Sb, NT into R Le, hip abd 4/5  Observation:   Test measurements:      RESTRICTIONS/PRECAUTIONS: R hip flexor, Lateral hip pain and LE tingling    Exercises/Interventions:     Therapeutic Ex Wt / Resistance sets/sec reps notes   Treadmill 1.5 mph  10 min    Bridge 2-1  2 10    Kneeling Alt Arm-Leg       Side lying LB rot       Front Plank       Retro lunge with slide  1 15    Kneeling hip abd/ext       Standing TB pull  grey 1 15 complexities listed. [] Progression has been slowed due to co-morbidities. [] Plan just implemented, too soon to assess goals progression  [] Other:     ASSESSMENT:  Patient had improved endurance and ROM , decreased pain with sidebending after DTM to TFL. Endurance is greatly limited at this point.          Treatment/Activity Tolerance:  [] Patient tolerated treatment well [] Patient limited by fatique  [] Patient limited by pain  [] Patient limited by other medical complications  [] Other:     Prognosis: [] Good [] Fair  [] Poor    Patient Requires Follow-up: [x] Yes  [] No    PLAN: Cont 1-2 x per week for 3 more weeks  [] Continue per plan of care [] Alter current plan (see comments)  [x] Plan of care initiated [] Hold pending MD visit [] Discharge    Electronically signed by: Elicia Hawley PT The patient was seen and evaluated independently by the attending physician.  - no events today. pt is at high risk for abrupt decompensation as she is on high flow and is nutritionally depleted. will monitor closely.

## (undated) DEVICE — FORCEPS BX 240CM 2.4MM L NDL RAD JAW 4 M00513334